# Patient Record
Sex: FEMALE | Race: WHITE
[De-identification: names, ages, dates, MRNs, and addresses within clinical notes are randomized per-mention and may not be internally consistent; named-entity substitution may affect disease eponyms.]

---

## 2020-02-04 ENCOUNTER — HOSPITAL ENCOUNTER (INPATIENT)
Dept: HOSPITAL 82 - MS2 | Age: 80
LOS: 5 days | Discharge: NURSING, REHAB OR LONG-TERM CARE | DRG: 191 | End: 2020-02-09
Attending: INTERNAL MEDICINE | Admitting: INTERNAL MEDICINE
Payer: MEDICARE

## 2020-02-04 VITALS — BODY MASS INDEX: 50.02 KG/M2 | WEIGHT: 293 LBS | HEIGHT: 64 IN

## 2020-02-04 VITALS — SYSTOLIC BLOOD PRESSURE: 128 MMHG | DIASTOLIC BLOOD PRESSURE: 66 MMHG

## 2020-02-04 VITALS — DIASTOLIC BLOOD PRESSURE: 84 MMHG | SYSTOLIC BLOOD PRESSURE: 143 MMHG

## 2020-02-04 VITALS — SYSTOLIC BLOOD PRESSURE: 140 MMHG | DIASTOLIC BLOOD PRESSURE: 84 MMHG

## 2020-02-04 DIAGNOSIS — E11.65: ICD-10-CM

## 2020-02-04 DIAGNOSIS — Z95.5: ICD-10-CM

## 2020-02-04 DIAGNOSIS — Z79.01: ICD-10-CM

## 2020-02-04 DIAGNOSIS — N18.3: ICD-10-CM

## 2020-02-04 DIAGNOSIS — G47.33: ICD-10-CM

## 2020-02-04 DIAGNOSIS — I48.20: ICD-10-CM

## 2020-02-04 DIAGNOSIS — E11.22: ICD-10-CM

## 2020-02-04 DIAGNOSIS — Z79.4: ICD-10-CM

## 2020-02-04 DIAGNOSIS — E78.5: ICD-10-CM

## 2020-02-04 DIAGNOSIS — I25.10: ICD-10-CM

## 2020-02-04 DIAGNOSIS — Z99.81: ICD-10-CM

## 2020-02-04 DIAGNOSIS — T38.0X5A: ICD-10-CM

## 2020-02-04 DIAGNOSIS — E66.01: ICD-10-CM

## 2020-02-04 DIAGNOSIS — E87.5: ICD-10-CM

## 2020-02-04 DIAGNOSIS — J43.9: Primary | ICD-10-CM

## 2020-02-04 DIAGNOSIS — E03.9: ICD-10-CM

## 2020-02-04 LAB
ALBUMIN SERPL-MCNC: 4.4 G/DL (ref 3.2–5)
ALP SERPL-CCNC: 83 U/L (ref 38–126)
ANION GAP SERPL CALCULATED.3IONS-SCNC: 21 MMOL/L
AST SERPL-CCNC: 25 U/L (ref 9–36)
BACTERIA #/AREA URNS HPF: (no result) HPF
BASOPHILS NFR BLD AUTO: 1 % (ref 0–3)
BILIRUB UR QL STRIP.AUTO: NEGATIVE
BUN SERPL-MCNC: 39 MG/DL (ref 8–23)
BUN/CREAT SERPL: 28
CHLORIDE SERPL-SCNC: 97 MMOL/L (ref 95–108)
CLARITY UR: (no result)
CO2 SERPL-SCNC: 23 MMOL/L (ref 22–30)
COLOR UR AUTO: YELLOW
CREAT SERPL-MCNC: 1.4 MG/DL (ref 0.5–1)
EOSINOPHIL NFR BLD AUTO: 1 % (ref 0–8)
ERYTHROCYTE [DISTWIDTH] IN BLOOD BY AUTOMATED COUNT: 13.3 % (ref 11.5–15.5)
GLUCOSE UR STRIP.AUTO-MCNC: NEGATIVE MG/DL
HCT VFR BLD AUTO: 52.1 % (ref 37–47)
HGB BLD-MCNC: 17 G/DL (ref 12–16)
HGB UR QL STRIP.AUTO: NEGATIVE
IMM GRANULOCYTES NFR BLD: 1.2 % (ref 0–5)
KETONES UR STRIP.AUTO-MCNC: NEGATIVE MG/DL
LEUKOCYTE ESTERASE UR QL STRIP.AUTO: (no result)
LYMPHOCYTES NFR BLD: 14 % (ref 15–41)
MCH RBC QN AUTO: 31 PG  CALC (ref 26–32)
MCHC RBC AUTO-ENTMCNC: 32.6 G/L CALC (ref 32–36)
MCV RBC AUTO: 94.9 FL  CALC (ref 80–100)
MONOCYTES NFR BLD AUTO: 12 % (ref 2–13)
NEUTROPHILS # BLD AUTO: 7.07 THOU/UL (ref 2–7.15)
NEUTROPHILS NFR BLD AUTO: 71 % (ref 42–76)
NITRITE UR QL STRIP.AUTO: NEGATIVE
PH UR STRIP.AUTO: 5 [PH] (ref 4.5–8)
PLATELET # BLD AUTO: 311 THOU/UL (ref 130–400)
POTASSIUM SERPL-SCNC: 5.5 MMOL/L (ref 3.5–5.1)
PROT SERPL-MCNC: 7.6 G/DL (ref 6.3–8.2)
PROT UR STRIP.AUTO-MCNC: NEGATIVE MG/DL
RBC # BLD AUTO: 5.49 MILL/UL (ref 4.2–5.6)
RBC #/AREA URNS HPF: (no result) RBC/HPF (ref 0–5)
SODIUM SERPL-SCNC: 135 MMOL/L (ref 137–146)
SP GR UR STRIP.AUTO: 1.02
SQUAMOUS URNS QL MICRO: (no result) EPI/HPF
UROBILINOGEN UR QL STRIP.AUTO: 0.2 E.U./DL
WBC #/AREA URNS HPF: (no result) WBC/HPF (ref 0–5)

## 2020-02-04 NOTE — NUR
PT. SITTING UP ON THE SIDE OF THE BED WITH SOB ON EXERTION NOTED. UA OBTAINED.
O2 INFUSING PER NC AS PER ORDER. PT. REPORTS SHE WEARS 3LITERS/MIN AT NIGHT,
INCREASED AT THIS TIME. ASSESSMENT COMPLETED. IV SITE PATENT AND SL. TELEMETRY
IN PLACE. PT. DECLINES ANJEL HOSE TO BE APPLIED. DRY, FLAKEY SKIN NOTED AND
BRUISING NOTED TO BUE. INSTRUCTED TO CALL FOR ANY NEEDS. CALL LIGHT IS IN
REACH. WILL CONTINUE TO MONITOR.

## 2020-02-04 NOTE — NUR
PT. SITTING UP ON THE SIDE OF THE BED WITH NO DISTRESS NOTED; SCHED ABT HUNG.
VSS. ENCOURAGED TO CALL FOR ANY NEEDS. CALL LIGHT IS IN REACH.

## 2020-02-04 NOTE — NUR
PT. RETURNED FROM XRAY AND MEDICATED WITH ORDERED /SCHED MEDS. PO FLUIDS
OFFERED.HUMIDIFIER APPLIED TO O2 PER PT'S REQUEST. DENIES FURTHER NEEDS. CALL
LIGHT IS IN REACH.

## 2020-02-05 VITALS — DIASTOLIC BLOOD PRESSURE: 63 MMHG | SYSTOLIC BLOOD PRESSURE: 128 MMHG

## 2020-02-05 VITALS — SYSTOLIC BLOOD PRESSURE: 150 MMHG | DIASTOLIC BLOOD PRESSURE: 74 MMHG

## 2020-02-05 VITALS — DIASTOLIC BLOOD PRESSURE: 85 MMHG | SYSTOLIC BLOOD PRESSURE: 147 MMHG

## 2020-02-05 VITALS — SYSTOLIC BLOOD PRESSURE: 146 MMHG | DIASTOLIC BLOOD PRESSURE: 98 MMHG

## 2020-02-05 VITALS — SYSTOLIC BLOOD PRESSURE: 145 MMHG | DIASTOLIC BLOOD PRESSURE: 84 MMHG

## 2020-02-05 VITALS — SYSTOLIC BLOOD PRESSURE: 148 MMHG | DIASTOLIC BLOOD PRESSURE: 90 MMHG

## 2020-02-05 LAB
ALBUMIN SERPL-MCNC: 4.1 G/DL (ref 3.2–5)
ALP SERPL-CCNC: 77 U/L (ref 38–126)
ANION GAP SERPL CALCULATED.3IONS-SCNC: 18 MMOL/L
AST SERPL-CCNC: 41 U/L (ref 9–36)
BASOPHILS NFR BLD AUTO: 0 % (ref 0–3)
BUN SERPL-MCNC: 44 MG/DL (ref 8–23)
BUN/CREAT SERPL: 30
CHLORIDE SERPL-SCNC: 93 MMOL/L (ref 95–108)
CO2 SERPL-SCNC: 25 MMOL/L (ref 22–30)
CREAT SERPL-MCNC: 1.5 MG/DL (ref 0.5–1)
EOSINOPHIL NFR BLD AUTO: 0 % (ref 0–8)
ERYTHROCYTE [DISTWIDTH] IN BLOOD BY AUTOMATED COUNT: 13.2 % (ref 11.5–15.5)
HCT VFR BLD AUTO: 46.5 % (ref 37–47)
HGB BLD-MCNC: 15.3 G/DL (ref 12–16)
IMM GRANULOCYTES NFR BLD: 0.7 % (ref 0–5)
LYMPHOCYTES NFR BLD: 7 % (ref 15–41)
MAGNESIUM SERPL-MCNC: 1.8 MG/DL (ref 1.6–2.3)
MCH RBC QN AUTO: 30.8 PG  CALC (ref 26–32)
MCHC RBC AUTO-ENTMCNC: 32.9 G/L CALC (ref 32–36)
MCV RBC AUTO: 93.6 FL  CALC (ref 80–100)
MONOCYTES NFR BLD AUTO: 5 % (ref 2–13)
NEUTROPHILS # BLD AUTO: 10.71 THOU/UL (ref 2–7.15)
NEUTROPHILS NFR BLD AUTO: 88 % (ref 42–76)
PLATELET # BLD AUTO: 304 THOU/UL (ref 130–400)
POTASSIUM SERPL-SCNC: 6 MMOL/L (ref 3.5–5.1)
PROT SERPL-MCNC: 7.3 G/DL (ref 6.3–8.2)
RBC # BLD AUTO: 4.97 MILL/UL (ref 4.2–5.6)
SODIUM SERPL-SCNC: 130 MMOL/L (ref 137–146)

## 2020-02-05 NOTE — NUR
IV SITE PATENT AND FLUSHED WITH NS. DENIES FURTHER NEEDS. CALL LIGHT IS IN
REACH. WILL CONTINUE TO MONITOR.

## 2020-02-05 NOTE — NUR
ASSESSMENT IS COMPLETED: IV SITE IS FREE FROM REDNESS OR EDMEA. HR IS IRREG,
PULSES ARE STRONG X4, ABD IS SOFT WITH ACTIVE BS,.BREATH SOUNDS ARE CLEAR AND
DIMINSHED. O2 @ 2LITERSD WHILE AWAKE,. TELE MONTIOR IN PLACE.

## 2020-02-05 NOTE — NUR
PT IS RELAXING IN BED , VISITING WITH FAMILY NO DISTRESS NTOED. IV SITE IS
FREE FROMR EDNESS OR EDEMA.

## 2020-02-05 NOTE — NUR
PT IS RELAXING IN BED WITH NO DISTRESS NOTED. IV SITE IS FREE FROM REDNESS OR
EDEMA. CONTINEU TO OSBERVE AND MONITOR.

## 2020-02-05 NOTE — NUR
PT. SITTING UP IN BED WITH EXERTIONAL SOB. VSS. O2 INFUSING PER NC. FRESH
WATER PROVIDED. DENIES FURTHER NEEDS. CALL LIGHT IS IN REACH.

## 2020-02-05 NOTE — NUR
PATIENT RESTING IN BED WITH C-PAP IN PLACE. ZOSYN HUNG VIA RAC AS ORDERED.
TELE MONITOR IN PLACE. NO COMPLAINTS AT THIS TIME. SAFETY PRECAUTIONS
REINFORCED. ALL LIGHT IN REACH. MEHRAN CONT TO MONITOR.

## 2020-02-05 NOTE — NUR
PT. SLIGHTLY RESTLESS AND SLEEPLESS AND MEDICATED WITH ORDERED PRN ATIVAN
ALONG WITH ONE TIME DOSE OF ELIQUIS. WILL REASSESS.

## 2020-02-06 VITALS — DIASTOLIC BLOOD PRESSURE: 93 MMHG | SYSTOLIC BLOOD PRESSURE: 147 MMHG

## 2020-02-06 VITALS — SYSTOLIC BLOOD PRESSURE: 137 MMHG | DIASTOLIC BLOOD PRESSURE: 75 MMHG

## 2020-02-06 VITALS — DIASTOLIC BLOOD PRESSURE: 85 MMHG | SYSTOLIC BLOOD PRESSURE: 143 MMHG

## 2020-02-06 VITALS — SYSTOLIC BLOOD PRESSURE: 166 MMHG | DIASTOLIC BLOOD PRESSURE: 79 MMHG

## 2020-02-06 VITALS — DIASTOLIC BLOOD PRESSURE: 73 MMHG | SYSTOLIC BLOOD PRESSURE: 139 MMHG

## 2020-02-06 VITALS — DIASTOLIC BLOOD PRESSURE: 85 MMHG | SYSTOLIC BLOOD PRESSURE: 154 MMHG

## 2020-02-06 LAB
ANION GAP SERPL CALCULATED.3IONS-SCNC: 21 MMOL/L
BASOPHILS NFR BLD AUTO: 0 % (ref 0–3)
BUN SERPL-MCNC: 47 MG/DL (ref 8–23)
BUN/CREAT SERPL: 31
CHLORIDE SERPL-SCNC: 94 MMOL/L (ref 95–108)
CO2 SERPL-SCNC: 23 MMOL/L (ref 22–30)
CREAT SERPL-MCNC: 1.5 MG/DL (ref 0.5–1)
EOSINOPHIL NFR BLD AUTO: 0 % (ref 0–8)
ERYTHROCYTE [DISTWIDTH] IN BLOOD BY AUTOMATED COUNT: 13.2 % (ref 11.5–15.5)
HCT VFR BLD AUTO: 50.6 % (ref 37–47)
HGB BLD-MCNC: 16.5 G/DL (ref 12–16)
IMM GRANULOCYTES NFR BLD: 0.7 % (ref 0–5)
LYMPHOCYTES NFR BLD: 6 % (ref 15–41)
MAGNESIUM SERPL-MCNC: 2.1 MG/DL (ref 1.6–2.3)
MCH RBC QN AUTO: 30.5 PG  CALC (ref 26–32)
MCHC RBC AUTO-ENTMCNC: 32.6 G/L CALC (ref 32–36)
MCV RBC AUTO: 93.5 FL  CALC (ref 80–100)
MONOCYTES NFR BLD AUTO: 6 % (ref 2–13)
NEUTROPHILS # BLD AUTO: 13.28 THOU/UL (ref 2–7.15)
NEUTROPHILS NFR BLD AUTO: 87 % (ref 42–76)
PLATELET # BLD AUTO: 302 THOU/UL (ref 130–400)
POTASSIUM SERPL-SCNC: 5.4 MMOL/L (ref 3.5–5.1)
RBC # BLD AUTO: 5.41 MILL/UL (ref 4.2–5.6)
SODIUM SERPL-SCNC: 133 MMOL/L (ref 137–146)

## 2020-02-06 NOTE — NUR
CHANGE OF SHIFT REPORT RECEIVED FROM ANTONIA PEREZ. PT IN BED. PT IS ABLE TO MAKE
HER NEEDS KNOWN. WRITER WILL CONTINUE TO MONITOR.

## 2020-02-06 NOTE — NUR
PATIENT A/OX4, NO C/O PAIN, NO S/S RESP DISTRESS, PATIENT ON O2 VIA NC,
PATIENT GLUCOSE LEVEL DECREASE  FROM 499, AFTER NEW ORDER GIVEN FOR
CRITICAL GLUCOSE LAB, ARNP ORDER NEW SLIDING SCALE, FOLLOWED MD ORDERS,CALL
LIGHT WITHIN REACH

## 2020-02-06 NOTE — NUR
APPEARS SLEEPING WITH C=PAP IN PLACE. ANGEL MONITOR IN PLACE. CALL LIGHT IN
REACH. WILL CONT TO MONITOR.

## 2020-02-06 NOTE — NUR
PATIENT A/OX4, NO S/S RESP DISTRESS, PATIENT ON O2 VIA NC, NEB TX, SOLU
MEDROL, PATIENT NO C/O PAIN, PATIENT DM CRITICAL GLUCOSE LEVEL 499, NOTIFIED
ARNP, ARNP ORDERED 15 UNITS OF INSULIN AND DECREASE DOSE OF MEDROL, WILL
CONTINUE MONITOR PATIENT CALL LIGHT WITHIN

## 2020-02-07 VITALS — DIASTOLIC BLOOD PRESSURE: 90 MMHG | SYSTOLIC BLOOD PRESSURE: 160 MMHG

## 2020-02-07 VITALS — SYSTOLIC BLOOD PRESSURE: 186 MMHG | DIASTOLIC BLOOD PRESSURE: 99 MMHG

## 2020-02-07 VITALS — DIASTOLIC BLOOD PRESSURE: 84 MMHG | SYSTOLIC BLOOD PRESSURE: 148 MMHG

## 2020-02-07 VITALS — DIASTOLIC BLOOD PRESSURE: 93 MMHG | SYSTOLIC BLOOD PRESSURE: 145 MMHG

## 2020-02-07 VITALS — DIASTOLIC BLOOD PRESSURE: 89 MMHG | SYSTOLIC BLOOD PRESSURE: 159 MMHG

## 2020-02-07 VITALS — DIASTOLIC BLOOD PRESSURE: 90 MMHG | SYSTOLIC BLOOD PRESSURE: 140 MMHG

## 2020-02-07 LAB
ANION GAP SERPL CALCULATED.3IONS-SCNC: 15 MMOL/L
BUN SERPL-MCNC: 46 MG/DL (ref 8–23)
BUN/CREAT SERPL: 32
CHLORIDE SERPL-SCNC: 92 MMOL/L (ref 95–108)
CO2 SERPL-SCNC: 27 MMOL/L (ref 22–30)
CREAT SERPL-MCNC: 1.4 MG/DL (ref 0.5–1)
ERYTHROCYTE [DISTWIDTH] IN BLOOD BY AUTOMATED COUNT: 13.2 % (ref 11.5–15.5)
HCT VFR BLD AUTO: 49.7 % (ref 37–47)
HGB BLD-MCNC: 16.2 G/DL (ref 12–16)
MAGNESIUM SERPL-MCNC: 2.2 MG/DL (ref 1.6–2.3)
MCH RBC QN AUTO: 30.9 PG  CALC (ref 26–32)
MCHC RBC AUTO-ENTMCNC: 32.6 G/L CALC (ref 32–36)
MCV RBC AUTO: 94.7 FL  CALC (ref 80–100)
PLATELET # BLD AUTO: 263 THOU/UL (ref 130–400)
POTASSIUM SERPL-SCNC: 5.4 MMOL/L (ref 3.5–5.1)
RBC # BLD AUTO: 5.25 MILL/UL (ref 4.2–5.6)
SODIUM SERPL-SCNC: 129 MMOL/L (ref 137–146)

## 2020-02-07 NOTE — NUR
PATIENT A/OX4, NO S/S RESP DISTRESS, PATIENT ON O2 VIA NC, PATIENT C/O 4/10
LOWER BACK WILL TREAT PATIENT BACK PAIN PER MD ORDERS, PATIENT BLOOD GLUCOSE
ELEVATED DUE TO SOLU MEDROL, PATIENT BLOOD GLUCOSE 325, PATIENT HEART RHYTHM
IN AFIB CONTROLLED, CALL LIGHT WITHIN REACH

## 2020-02-07 NOTE — NUR
PT SITTING UP IN BED, ALERT AND ORIENTED. RESPIRATIONS EVEN AND UNLABORED ON
O2 @ 3L VIA NC. LUNGS SOUND CLEAR/DIMINISHED. PEDAL PULSES WEAK. PT DENIES ANY
PAIN OR DICOMFORT AT THIS TIME.SAFETY PRECAUTIONS IN PLACE. WILL CONTINUE TO
MONITOR.

## 2020-02-07 NOTE — NUR
REPORT RECEIVED FROM ANTONIA PEREZ. PT IN THE SHOWER, DAUGHTER ASSISTING PT.
SAFETY PRECAUTIONS IN PLACE. WILL CONTINUE TO MONITOR.

## 2020-02-07 NOTE — NUR
PATIENT VOIDED X 1; ANTIBIOTIC RUNNNG PER ORDER. NO S/S OF DISTRESS. WRITER
WILL CONTINUE TO MONITOR

## 2020-02-07 NOTE — NUR
PATIENT A/OX4, NO S/S RESP DISTRESS PATIENT ON O2 VIA NC NEB TX AND SOLU
MEDROL, PATIENT TOLERATING IV ANTIBOTICS, PATIENT NO C/O PAIN AFTER PRN
TYLENOL GIVEN TYLENOL WAS EFFECTIVE, CALL LIGHT WITHIN REACH

## 2020-02-08 VITALS — DIASTOLIC BLOOD PRESSURE: 77 MMHG | SYSTOLIC BLOOD PRESSURE: 153 MMHG

## 2020-02-08 VITALS — DIASTOLIC BLOOD PRESSURE: 68 MMHG | SYSTOLIC BLOOD PRESSURE: 151 MMHG

## 2020-02-08 VITALS — DIASTOLIC BLOOD PRESSURE: 78 MMHG | SYSTOLIC BLOOD PRESSURE: 155 MMHG

## 2020-02-08 VITALS — DIASTOLIC BLOOD PRESSURE: 76 MMHG | SYSTOLIC BLOOD PRESSURE: 159 MMHG

## 2020-02-08 VITALS — DIASTOLIC BLOOD PRESSURE: 86 MMHG | SYSTOLIC BLOOD PRESSURE: 164 MMHG

## 2020-02-08 VITALS — DIASTOLIC BLOOD PRESSURE: 89 MMHG | SYSTOLIC BLOOD PRESSURE: 161 MMHG

## 2020-02-08 LAB
ANION GAP SERPL CALCULATED.3IONS-SCNC: 15 MMOL/L
BUN SERPL-MCNC: 47 MG/DL (ref 8–23)
BUN/CREAT SERPL: 34
CHLORIDE SERPL-SCNC: 94 MMOL/L (ref 95–108)
CO2 SERPL-SCNC: 31 MMOL/L (ref 22–30)
CREAT SERPL-MCNC: 1.4 MG/DL (ref 0.5–1)
ERYTHROCYTE [DISTWIDTH] IN BLOOD BY AUTOMATED COUNT: 13.2 % (ref 11.5–15.5)
HCT VFR BLD AUTO: 47.3 % (ref 37–47)
HGB BLD-MCNC: 15.6 G/DL (ref 12–16)
MAGNESIUM SERPL-MCNC: 2.3 MG/DL (ref 1.6–2.3)
MCH RBC QN AUTO: 30.9 PG  CALC (ref 26–32)
MCHC RBC AUTO-ENTMCNC: 33 G/L CALC (ref 32–36)
MCV RBC AUTO: 93.7 FL  CALC (ref 80–100)
PLATELET # BLD AUTO: 278 THOU/UL (ref 130–400)
POTASSIUM SERPL-SCNC: 4.9 MMOL/L (ref 3.5–5.1)
RBC # BLD AUTO: 5.05 MILL/UL (ref 4.2–5.6)
SODIUM SERPL-SCNC: 135 MMOL/L (ref 137–146)

## 2020-02-08 NOTE — NUR
SLEEPING ON ROUNDS. AWAKENS TO NAME. USNG O2 AT 3 L NC. O2 SAT 97% RESP
NON-LABORED AT REST, HERNANDEZ. BREATH SOUNDS ESSENTIALLY CLEAR, DIMINISHED
THROUGHOUT LUNG MERRILL. MOIST, NON-PRODUCTIVE COUGH NOTED. KATHLEEN AND BLE WITH
PIGMENTATION CHANGES. SALINE LOCK IN RAC, FLUSHED AND PATENT.DISCUSSED PLAN OF
CARE. PATIENT HOPES TO BE DISCHARGED TODAY. DENIES NEEDS AT THIS TIME. CALL
BELL IN REACH.

## 2020-02-08 NOTE — NUR
REPORT RECEIVED FROM ANTONIA DONG. PT SITTING IN BED. NO S/S OF DISTRESS. SAFETY
PRECAUTIONS IN PLACE. WILL CONTINUE TO MONITOR.

## 2020-02-08 NOTE — NUR
PT SITTING ON THE SIDE OF THE BED, ALERT AND ORIENTED. RESPIRATIONS EVEN AND
UNLABORED ON O2 @ 3L VIA NC. LUNGS SOUND DIMINISHED. PEDAL PULSES WEAK. PT
DENIES ANY PAIN OR DISCOMFORT AT THIS TIME. ADDED WATER TO CPAP PER REQUEST.
NO IV SITE AT THIS TIME, MD AWARE. SAFETY PRECAUTIONS IN PLACE. WILL CONTINUE
TO MONITOR.

## 2020-02-08 NOTE — NUR
PT RESTING IN BED. NO S/S OF DISTRESS AT THIS TIME. SAFETY PRECAUTIONS IN
PLACE. WILL CONTINUE TO MONITOR.

## 2020-02-08 NOTE — NUR
RESTING IN BED. DISCUSSED PLAN OF CARE. DISCHARGE IS CURRENTLY ON HOLD. IV
MEDICATIONS WILL BE CHANGED TO PO AND WILL LEAVE WITHOUT IV ACCESS AT THIS
TIME PER5 E EVERARDO/MILO.

## 2020-02-08 NOTE — NUR
PATIENT C/O IV SITE TENDER, STOPPED IVAB INFUSION. SALINE LOCK DC'D WITH
CATHETER INTACT. UNABLE TO ESTABLISH NEW IV SITE. WILL INFORM MD.

## 2020-02-08 NOTE — NUR
PT SITTING IN BED, ALERT AND ORIENTED. RESPIRATIONS EVEN AND UNLABORED ON O2 @
3L VIA NC. NO S/S OF DISTRESS AT THIS TIME. SAFETY PRECAUTIONS IN PLACE. WILL
CONTINUE TO MONTIOR.

## 2020-02-09 VITALS — SYSTOLIC BLOOD PRESSURE: 148 MMHG | DIASTOLIC BLOOD PRESSURE: 78 MMHG

## 2020-02-09 VITALS — SYSTOLIC BLOOD PRESSURE: 145 MMHG | DIASTOLIC BLOOD PRESSURE: 64 MMHG

## 2020-02-09 VITALS — SYSTOLIC BLOOD PRESSURE: 136 MMHG | DIASTOLIC BLOOD PRESSURE: 82 MMHG

## 2020-02-09 LAB
ANION GAP SERPL CALCULATED.3IONS-SCNC: 12 MMOL/L
BASOPHILS NFR BLD AUTO: 0 % (ref 0–3)
BUN SERPL-MCNC: 52 MG/DL (ref 8–23)
BUN/CREAT SERPL: 34
CHLORIDE SERPL-SCNC: 94 MMOL/L (ref 95–108)
CO2 SERPL-SCNC: 33 MMOL/L (ref 22–30)
CREAT SERPL-MCNC: 1.5 MG/DL (ref 0.5–1)
EOSINOPHIL NFR BLD AUTO: 0 % (ref 0–8)
ERYTHROCYTE [DISTWIDTH] IN BLOOD BY AUTOMATED COUNT: 13.2 % (ref 11.5–15.5)
HCT VFR BLD AUTO: 48 % (ref 37–47)
HGB BLD-MCNC: 15.6 G/DL (ref 12–16)
IMM GRANULOCYTES NFR BLD: 0.9 % (ref 0–5)
LYMPHOCYTES NFR BLD: 13 % (ref 15–41)
MAGNESIUM SERPL-MCNC: 2.3 MG/DL (ref 1.6–2.3)
MCH RBC QN AUTO: 30.6 PG  CALC (ref 26–32)
MCHC RBC AUTO-ENTMCNC: 32.5 G/L CALC (ref 32–36)
MCV RBC AUTO: 94.1 FL  CALC (ref 80–100)
MONOCYTES NFR BLD AUTO: 18 % (ref 2–13)
NEUTROPHILS # BLD AUTO: 9.93 THOU/UL (ref 2–7.15)
NEUTROPHILS NFR BLD AUTO: 68 % (ref 42–76)
PLATELET # BLD AUTO: 290 THOU/UL (ref 130–400)
POTASSIUM SERPL-SCNC: 4.2 MMOL/L (ref 3.5–5.1)
RBC # BLD AUTO: 5.1 MILL/UL (ref 4.2–5.6)
SODIUM SERPL-SCNC: 134 MMOL/L (ref 137–146)

## 2020-02-09 NOTE — NUR
3003 COMPLETED. NO C/O PAIN NOR DISCOMFORT. NO S/S OF RESP DISTRESS. NO SKIN
ISSUES NOTED BUT BRUISES ON ARMS/LEGS. PATIENT ON BLOOD THINNER. PATIENT
FRIEND AT BEDSIDE IS TAKING PATIENT TO SNF

## 2020-02-09 NOTE — NUR
PT RESTING IN BED, NO S/S OF DISTRESS AT THIS TIME. SAFETY PRECAUTIONS IN
PLACE. WILL CONTIUE TO MONITOR.

## 2020-02-09 NOTE — NUR
HEAD TO ASSESSMENT COMPLETED. POC DISCUSSED. NO C/O RESP DISTRESS NOR S/S.
POSTIVE FOR BOWEL SOUNDS. SKIN INTACT. NO C/O PAIN. PATIENT A&0X3

## 2020-11-02 ENCOUNTER — HOSPITAL ENCOUNTER (INPATIENT)
Dept: HOSPITAL 82 - MS2 | Age: 80
LOS: 10 days | Discharge: NURSING, REHAB OR LONG-TERM CARE | DRG: 292 | End: 2020-11-12
Attending: INTERNAL MEDICINE | Admitting: INTERNAL MEDICINE
Payer: MEDICARE

## 2020-11-02 VITALS — DIASTOLIC BLOOD PRESSURE: 77 MMHG | SYSTOLIC BLOOD PRESSURE: 149 MMHG

## 2020-11-02 VITALS — WEIGHT: 293 LBS | HEIGHT: 64 IN | BODY MASS INDEX: 50.02 KG/M2

## 2020-11-02 VITALS — DIASTOLIC BLOOD PRESSURE: 79 MMHG | SYSTOLIC BLOOD PRESSURE: 141 MMHG

## 2020-11-02 VITALS — SYSTOLIC BLOOD PRESSURE: 157 MMHG | DIASTOLIC BLOOD PRESSURE: 67 MMHG

## 2020-11-02 VITALS — SYSTOLIC BLOOD PRESSURE: 162 MMHG | DIASTOLIC BLOOD PRESSURE: 69 MMHG

## 2020-11-02 DIAGNOSIS — T50.1X6A: ICD-10-CM

## 2020-11-02 DIAGNOSIS — E78.5: ICD-10-CM

## 2020-11-02 DIAGNOSIS — Z91.128: ICD-10-CM

## 2020-11-02 DIAGNOSIS — I50.33: Primary | ICD-10-CM

## 2020-11-02 DIAGNOSIS — N18.30: ICD-10-CM

## 2020-11-02 DIAGNOSIS — I48.20: ICD-10-CM

## 2020-11-02 DIAGNOSIS — E11.22: ICD-10-CM

## 2020-11-02 DIAGNOSIS — E03.9: ICD-10-CM

## 2020-11-02 DIAGNOSIS — G47.33: ICD-10-CM

## 2020-11-02 DIAGNOSIS — Z79.01: ICD-10-CM

## 2020-11-02 DIAGNOSIS — E66.01: ICD-10-CM

## 2020-11-02 DIAGNOSIS — F41.9: ICD-10-CM

## 2020-11-02 DIAGNOSIS — J43.9: ICD-10-CM

## 2020-11-02 DIAGNOSIS — M25.50: ICD-10-CM

## 2020-11-02 DIAGNOSIS — E87.5: ICD-10-CM

## 2020-11-02 DIAGNOSIS — Z79.4: ICD-10-CM

## 2020-11-02 DIAGNOSIS — Z99.81: ICD-10-CM

## 2020-11-02 DIAGNOSIS — I25.10: ICD-10-CM

## 2020-11-02 DIAGNOSIS — Z95.5: ICD-10-CM

## 2020-11-02 DIAGNOSIS — N17.9: ICD-10-CM

## 2020-11-02 DIAGNOSIS — E87.3: ICD-10-CM

## 2020-11-02 DIAGNOSIS — Z20.828: ICD-10-CM

## 2020-11-02 LAB
ALBUMIN SERPL-MCNC: 3.9 G/DL (ref 3.2–5)
ALP SERPL-CCNC: 92 U/L (ref 38–126)
ANION GAP SERPL CALCULATED.3IONS-SCNC: 15 MMOL/L
AST SERPL-CCNC: 34 U/L (ref 9–36)
BASOPHILS NFR BLD AUTO: 1 % (ref 0–3)
BILIRUB UR QL STRIP.AUTO: NEGATIVE
BUN SERPL-MCNC: 45 MG/DL (ref 8–23)
BUN/CREAT SERPL: 39
CHLORIDE SERPL-SCNC: 102 MMOL/L (ref 95–108)
CLARITY UR: CLEAR
CO2 SERPL-SCNC: 28 MMOL/L (ref 22–30)
COLOR UR AUTO: YELLOW
CREAT SERPL-MCNC: 1.2 MG/DL (ref 0.5–1)
EOSINOPHIL NFR BLD AUTO: 2 % (ref 0–8)
ERYTHROCYTE [DISTWIDTH] IN BLOOD BY AUTOMATED COUNT: 17.2 % (ref 11.5–15.5)
GLUCOSE UR STRIP.AUTO-MCNC: NEGATIVE MG/DL
HCT VFR BLD AUTO: 45.7 % (ref 37–47)
HGB BLD-MCNC: 13.5 G/DL (ref 12–16)
HGB UR QL STRIP.AUTO: (no result)
IMM GRANULOCYTES NFR BLD: 0.4 % (ref 0–5)
KETONES UR STRIP.AUTO-MCNC: NEGATIVE MG/DL
LEUKOCYTE ESTERASE UR QL STRIP.AUTO: NEGATIVE
LYMPHOCYTES NFR BLD: 11 % (ref 15–41)
MCH RBC QN AUTO: 30.7 PG  CALC (ref 26–32)
MCHC RBC AUTO-ENTMCNC: 29.5 G/DL CAL (ref 32–36)
MCV RBC AUTO: 103.9 FL  CALC (ref 80–100)
MONOCYTES NFR BLD AUTO: 15 % (ref 2–13)
NEUTROPHILS # BLD AUTO: 5.17 THOU/UL (ref 2–7.15)
NEUTROPHILS NFR BLD AUTO: 70 % (ref 42–76)
NITRITE UR QL STRIP.AUTO: NEGATIVE
PH UR STRIP.AUTO: 5.5 [PH] (ref 4.5–8)
PLATELET # BLD AUTO: 244 THOU/UL (ref 130–400)
POTASSIUM SERPL-SCNC: 5.9 MMOL/L (ref 3.5–5.1)
PROT SERPL-MCNC: 7 G/DL (ref 6.3–8.2)
PROT UR STRIP.AUTO-MCNC: NEGATIVE MG/DL
RBC # BLD AUTO: 4.4 MILL/UL (ref 4.2–5.6)
SODIUM SERPL-SCNC: 139 MMOL/L (ref 137–146)
SP GR UR STRIP.AUTO: 1.01
UROBILINOGEN UR QL STRIP.AUTO: 0.2 E.U./DL

## 2020-11-02 PROCEDURE — 0T9B70Z DRAINAGE OF BLADDER WITH DRAINAGE DEVICE, VIA NATURAL OR ARTIFICIAL OPENING: ICD-10-PCS | Performed by: INTERNAL MEDICINE

## 2020-11-02 NOTE — NUR
PT ARRIVED TO MED/SURG ROOM 262 IN STABLE CONDITION VIA WHEELCHAIR ACCOMPANIED
BY DAUGHTER;PT ASSISTED TO STANDING SCALE AND BEDSIDE WITH X2 ASSIST;PT A&O
X3,ORIENTED TO ROOM AND CALL LIGHT SYSTEM;PT REPORTS INCREASED SOB WITH
EXERTION;WT AND VS OBTAINED BY MARLEN SINGH;PT DENIES ANY CURRENT PAIN OR
DISCOMFORTS,PAIN SCALE AND REPORTING EDUCATED;RESPIRATIONS SHALLOW ON O2 @ 3L
VIA NC,IT SHOULD BE NOTED THAT PT IS OXYGEN DEPENDENT;EXERTIONAL SOB NOTED
WITH ANY SLIGHT MOVEMENT;NON-PRODUCTIVE COUGH NOTED;ABDOMEN DISTENDED/FIRM ON
PALPATION AND ACTIVE IN ALL 4 QUADRANTS,LAST BM 11/02/20;WEAK PEDAL
PULSES;MULTIPLE SCABBED AREAS NOTED THROUGHOUT BODY,SKIN OTHERWISE INTACT;TELE
MONITORING PLACED ON PATIENT;#22G STARTED TO LAC ON 4TH ATTEMPT BY
ANTONIA FERNÁNDEZ;FALL AND ALLERGY BAND APPLIED TO RIGHT HAND;PT DENIES ANY
ADDITIONAL NEEDS AT THIS TIME AND IS ENCOURAGED TO CALL FOR ASSISTANCE IF
NEEDED;FALL PRECAUTIONS IN PLACE WITH BED IN THE LOWEST POSITION AND CALL
LIGHT IN REACH;WILL CONTINUE TO MONITOR

## 2020-11-02 NOTE — NUR
PATIENT RESTING IN BED POSITIONED ON LEFT SIDE WITH O2 VIA NASAL CANNULA IN
PLACE. EYES CLOSED AND APPEARS SLEEPING-LABORED BREATHING. WHITE CATH PATENT
AND DRAINING YELLOW URINE. TELE MONITOR IN PLACE. SALINE LOCK TO RAC INTACT.
CALL LIGHT IN REACH. WILL CONT TO MONITOR.

## 2020-11-02 NOTE — NUR
#16F WHITE INSERTED AT THIS, PT TOLERATED WELL. 800CC OF CLEAR/YELLOW URINE
OBTAINED AT THIS TIME. CATHETER SIGNED AND DATED.WILL CONTINUE TO MONITOR

## 2020-11-02 NOTE — NUR
IV SITE LOST DUE TO INFILTRATION,SITE REMOVED WITH CATHETER INTACT;NEW #22G
STARTED TO RAC ON 1ST ATTEMPT BY ANTONIA FERNÁNDEZ;WILL CONTINUE TO MONITOR

## 2020-11-02 NOTE — NUR
PATIENT SITTING ON THE SIDE OF THE BED WITH O2 VIA NASAL CANNULA IN PLACE.
PATIENT IS SOB WITH NO EXHERSION. WEARS O2 AT HOME AS WELL. BS-150. PATIENT
MEDICATED WITH LEVEMIR 32UNITS AS ORDERED. HS SNACK PROVIDED. PATIENT WITH C/O
PAIN TO KNEES AND ANKLES-MEDICATED WITH TYLENOL 650MG PO FOR 7/10 ON PAIN
SCALE. PATIENT MEDICATED WITH XANAX 0.5MG PO FOR ANXIETY. LUNGS ARE DIMINISHED
WITH CRACKLES IN THE BASES. GENERALIZED EDEMA NOTED. WHITE CATH PATENT AND
DRAINING YELLOW URINE. PATIENT STATES THAT SHE WAS HAVING SOME BLADDER SPASMS
BUT BETTER NOW. TELE MONITOR IN PLACE. SALINE LOCK TO RAC INTACT WITH GOOD
BLOOD RETURN. SAFETY PRECAUTIONS REINFORCED. CALL LIGHT IN REACH. WILL CONT TO
MONITOR.

## 2020-11-02 NOTE — NUR
PT EDUCATED TO BRING IN HOME TRELEGY FOR VERIFICATION AND USE, PT REPORTS THAT
SHE WILL HAVE FAMILY BRING IN MEDICATION.

## 2020-11-02 NOTE — NUR
PT TRANSPORTED TO [facility] IN STABLE CONDITION VIA WHEELCHAIR ACCOMPANIED BY
MARLEN HOLLAND.

## 2020-11-03 VITALS — SYSTOLIC BLOOD PRESSURE: 137 MMHG | DIASTOLIC BLOOD PRESSURE: 79 MMHG

## 2020-11-03 VITALS — SYSTOLIC BLOOD PRESSURE: 113 MMHG | DIASTOLIC BLOOD PRESSURE: 60 MMHG

## 2020-11-03 VITALS — SYSTOLIC BLOOD PRESSURE: 150 MMHG | DIASTOLIC BLOOD PRESSURE: 55 MMHG

## 2020-11-03 VITALS — DIASTOLIC BLOOD PRESSURE: 89 MMHG | SYSTOLIC BLOOD PRESSURE: 139 MMHG

## 2020-11-03 VITALS — DIASTOLIC BLOOD PRESSURE: 88 MMHG | SYSTOLIC BLOOD PRESSURE: 145 MMHG

## 2020-11-03 LAB
ALBUMIN SERPL-MCNC: 4.1 G/DL (ref 3.2–5)
ALP SERPL-CCNC: 117 U/L (ref 38–126)
ANION GAP SERPL CALCULATED.3IONS-SCNC: 16 MMOL/L
AST SERPL-CCNC: 37 U/L (ref 9–36)
BUN SERPL-MCNC: 46 MG/DL (ref 8–23)
BUN/CREAT SERPL: 33
CHLORIDE SERPL-SCNC: 101 MMOL/L (ref 95–108)
CO2 SERPL-SCNC: 29 MMOL/L (ref 22–30)
CREAT SERPL-MCNC: 1.4 MG/DL (ref 0.5–1)
POTASSIUM SERPL-SCNC: 6.4 MMOL/L (ref 3.5–5.1)
PROT SERPL-MCNC: 6.9 G/DL (ref 6.3–8.2)
SODIUM SERPL-SCNC: 140 MMOL/L (ref 137–146)

## 2020-11-03 NOTE — NUR
ST screen completed. Recommend ST services to complete dysphagia assessment
d/t reports of difficulties chewing and swallowing on admission, oxygen
demands, and history of COPD, which places patient at a high risk for
aspiration. Consider cognitive screen.

## 2020-11-03 NOTE — NUR
PATIENT RESTING IN BED AT THIS TIME-POSITIONED ON LEFT SIDE. O2 VIA NASAL
CANNULA IN PLACE AT 3LPM. HOB ELEVATED. WHITE CATH PATENT AND DRAINING YELLOW
URINE. SALINE LOCK INTACT TO RAC. CALL LIGHT IN REACH. WILL CONT TO MONITOR.

## 2020-11-03 NOTE — NUR
PT MEDICATED WITH PTN TYLENOL 650MG PO FOR BLE PAIN RATING 9/10 ON THE PAIN
SCALE PER REQUEST, WILL CONTINUE TO MONITOR FOR EFFECTIVENESS

## 2020-11-03 NOTE — NUR
PATIENT SITTING UP ON SIDE OF BED AT THIS TIME. PATIENT DENIES ALL NEEDS
SIDERAILS UP CALL LIGHT WITHIN REACH. PATIENT STATES "NO" TO PAIN WITH ASKED
AND STATES PAIN IS A "0" OUT OF A SCALE OF 0-10.

## 2020-11-03 NOTE — NUR
PATIENT RESTING IN BED AT THIS TIME WITH HOB ELEVATED AND O2 VIA NASAL CANNULA
IN PLACE. WHITE PATENT AND DRAINING YELLOW URINE. SALINE LOCK INTACT TO
RAC-SITE APPEARS HEALTHY. TELE MONITOR IN PLACE. WEIGHT IS DOWN ALMOST 10LB
SINCE ADMISSION-353.8 THIS MORNING. CALL LIGHT IN REACH. WILL CONT TO MONITOR.

## 2020-11-03 NOTE — NUR
PATIENT SITTING UP AT BEDSIDE, O2 ON AT 3 LITERS, SON AT BEDSIDE. PATIENTS
RESPIRATIONS ARE LABORED AT THIS TIME. PATIENT DENIES ANY PAIN. WHITE INTACT
AND DRAINING CLEAR YELLOW URINE AT THIS TIME. NURSE ASSESSMENT DONE AT THIS
TIME SEE INTERVENTIONS. CALL LIGHT WITHIN REACH.

## 2020-11-03 NOTE — NUR
Patient is screened fro rehab intervention and would benefit from OT and PT
consults for functional asessment and energy conservation during ADLs if
medical agrees

## 2020-11-03 NOTE — NUR
Per discussion with Marcos KNIGHT, patient tolerating diet well. Will hold on ST
evaluation. Consult PRN.

## 2020-11-03 NOTE — NUR
PATIENT STILL AWAKE AND UNABLE TO SLEEP.  WEARING HER O2 VIA NASAL CANNULA AT
3LPM. C/O JOINT PAIN TO KNEES AND ANKLES-8/10 ON PAIN SCALE. MEDICATED WITH
TYLENOL 650MG PO FOR PAIN. MEDICATED WITH XANAX 0.5MG PO FOR RESTLESSNESS.
WHITE PATENT AND DRAINING YELLOW URINE. SAFETY PRECAUTIONS REINFORCED. CALL
LIGHT IN REACH. WILL CONT TO MONITOR.

## 2020-11-03 NOTE — NUR
PATIENT RESTING IN BED AT THIS TIME. PATIENT IS UP AND DOWN SEVERAL TIMES
ALREADY TONIGHT TRYING TO GET COMFORTABLE. PATIENT MEDICATED FOR JOINT PAIN
WITH TYLENOL 650MG PO FOR 6/10 PAIN SCALE. BS-228-MEDICATED WITH NOVALOG
2UNITS SQ AND WITH SCHEDULED LEVEMIR 32UNITS SQ-HS SNACK PROVIDED. LASIX 40MG
IVP VIA RAC SITE AND SOLU-MEDROL 40MG IVP GIVEN. GOOD BLOOD RETURN FROM RAC
SITE. SAFETY PRECAUTIONS REINFORCED. CALL LIGHT IN REACH. WILL CONT TO
MONITOR.

## 2020-11-03 NOTE — NUR
PATIENT SITTING UP ON THE SIDE OF THE BED-AWAKE ALERT AND ORIENTEDX3, O2 VIA
NASAL CANNULA IN PLACE. PATIENT IS SOB AT REST. TELE MONITOR IN PLACE. WHITE
CATH PATENT AND DRAINING YELLOW URINE-LEG STRAP IN PLACE TO RIGHT THIGH. LUNGS
STILL WITH FINE CRACKLES IN BASES. ABD IS LARGE AND SOFT WITH BM TODAY,
GENERALIOZED BODY EDEMA NOTED. ASSISTED PATIENT BACK TO BED. POSITIONED ON
LEFT SIDE. SAFETY PRECAUTIONS REINFORCED. CALL LIGHT IN REACH. WILL CONT TO
MONITOR.

## 2020-11-03 NOTE — NUR
PATIENT SITTING UP ON BEDSIDE, O2 ON 3 LITERS, PATIENT STATE HER PAIN LEVEL IS
A "O" OUT OF 0-10 PAIN SCALE. WHITE CATH REMAINS IN PLACE AND DRAINGING YELLOW
URINE AT THIS TIME. SIDERAILS ARE UP X2 CALL LIGHT IS WITHIN REACH AND PATIENT
DENIES ALL OTHER NEEDS AT THIS TIME.

## 2020-11-03 NOTE — NUR
PATIENT RESTING IN BED POSITIONED ON LEFT SIDE WITH HOB ELEVATED AND O2 VIA
NASAL CANNULA IN PLACE. ZOSYN HUNG AS ORDERED VIA RAC SITE. SITE STILL WITH
GOOD BLOOD RETURN. WHITE PATENT AND DRAING CLEAR YELLOW URINE. NO COMPLAINTS
AT THIS TIME. CALL LIGHT IN REACH. WILL CONT TO MONITOR.

## 2020-11-04 VITALS — DIASTOLIC BLOOD PRESSURE: 94 MMHG | SYSTOLIC BLOOD PRESSURE: 142 MMHG

## 2020-11-04 VITALS — SYSTOLIC BLOOD PRESSURE: 99 MMHG | DIASTOLIC BLOOD PRESSURE: 56 MMHG

## 2020-11-04 VITALS — SYSTOLIC BLOOD PRESSURE: 125 MMHG | DIASTOLIC BLOOD PRESSURE: 71 MMHG

## 2020-11-04 VITALS — SYSTOLIC BLOOD PRESSURE: 155 MMHG | DIASTOLIC BLOOD PRESSURE: 87 MMHG

## 2020-11-04 VITALS — DIASTOLIC BLOOD PRESSURE: 71 MMHG | SYSTOLIC BLOOD PRESSURE: 130 MMHG

## 2020-11-04 VITALS — DIASTOLIC BLOOD PRESSURE: 81 MMHG | SYSTOLIC BLOOD PRESSURE: 149 MMHG

## 2020-11-04 LAB
ALBUMIN SERPL-MCNC: 4.2 G/DL (ref 3.2–5)
ALP SERPL-CCNC: 97 U/L (ref 38–126)
ANION GAP SERPL CALCULATED.3IONS-SCNC: 18 MMOL/L
AST SERPL-CCNC: 29 U/L (ref 9–36)
BUN SERPL-MCNC: 56 MG/DL (ref 8–23)
BUN/CREAT SERPL: 38
CHLORIDE SERPL-SCNC: 98 MMOL/L (ref 95–108)
CO2 SERPL-SCNC: 26 MMOL/L (ref 22–30)
CREAT SERPL-MCNC: 1.5 MG/DL (ref 0.5–1)
ERYTHROCYTE [DISTWIDTH] IN BLOOD BY AUTOMATED COUNT: 17.1 % (ref 11.5–15.5)
HCT VFR BLD AUTO: 47.8 % (ref 37–47)
HGB BLD-MCNC: 14.3 G/DL (ref 12–16)
MCH RBC QN AUTO: 30.6 PG  CALC (ref 26–32)
MCHC RBC AUTO-ENTMCNC: 29.9 G/DL CAL (ref 32–36)
MCV RBC AUTO: 102.1 FL  CALC (ref 80–100)
PLATELET # BLD AUTO: 302 THOU/UL (ref 130–400)
POTASSIUM SERPL-SCNC: 5.2 MMOL/L (ref 3.5–5.1)
PROT SERPL-MCNC: 7.2 G/DL (ref 6.3–8.2)
RBC # BLD AUTO: 4.68 MILL/UL (ref 4.2–5.6)
SODIUM SERPL-SCNC: 137 MMOL/L (ref 137–146)

## 2020-11-04 NOTE — NUR
PATIENT AWAKE ALERT AND ORIENTEDX3. PATIENT REMAINS SOB AT REST.O2 IN PLACE.
BS-205-MEDICATED WITH NOVALOG 2 UNITS SQ PER SLIDING SCALE COVERAGE. LEVEMIR
32UNITS SQ GIVEN AS SCHEDULED. PROVIDED WITH HS SNACK. PATIENT MEDICATED WITH
ULTRAM 50MG PO FOR 6/10 JOINT PAIN. MEDICATED WITH SONATA 5MG PO FOR SLEEP.
SAFETY PRECAUTIONS REINFORCED. CALL LIGHT IN REACH. WILL CONT TO MONITOR.

## 2020-11-04 NOTE — NUR
REPORTS THAT TYLENOL WAS EFFECTIVE AND PAIN HAS DECREASED TO A 3/10. AGAIN
RESTING ON LEFT SIDE AFTER ASSISTANCE PROVIDED FOR POSITION CHANGE. ZOSYN
INFUSING AT THIS TIME.

## 2020-11-04 NOTE — NUR
PATIENT RESTING IN BED WITH HOB ELEVATED AND POSITIONED ON LEFT SIDE. O2 VIA
NASAL CANNULA IN PLACE AT 3LPM. TELE MONITOR IN PLACE. WHITE CATH PATENT AND
DRAINING YELLOW URINE. SALINE LOCK INTACT TO RAC-SITE REMAINS HEALTHY AT THIS
TIME. CALL LIGHT IN REACH. WILL CONT TO MONITOR.

## 2020-11-04 NOTE — NUR
TYLENOL GIVEN FOR 4/10 PAIN. PT SITTING UP ON EDGE OF BED REQUESTING BREATHING
TREATMENT FOR SOB. RT AT BEDSIDE.

## 2020-11-04 NOTE — NUR
ULTRAM GIVEN FOR 8/10 PAIN TO LEFT KNEE AND LOWER BACK. BACK IN BED RESTING ON
LEFT SIDE. ZOSYN INFUSING AT THIS TIME; IV SITE FLUSHES WELL. CALL LIGHT
WITHIN REACH.

## 2020-11-04 NOTE — NUR
REPORT RECEIVED FROM ANTONIA BELTRÁN. PT RESTING IN BED SEMI FOWLERS WITH EYES
CLOSED AND NO SIGNS OF DISTRESS. CALL LIGHT WITHIN REACH.

## 2020-11-04 NOTE — NUR
SITTING UP ON EDGE OF BED. DR. MILNER AT BEDSIDE ALONG WITH JENNIFER AGUILAR. C/O
8/10 KNEE, ANKLE, AND LOWER BACK PAIN. RESPIRATIONS SLIGHTLY LABORED ON OXYGEN
3L; PT REPORTS WEARING OXGYEN AT HOME 2L DURING THE DAY, 3L DURING THE NIGHT,
AND CPAP AT NIGHT. REQUESTING TYLENOL FOR PAIN.

## 2020-11-04 NOTE — NUR
SITTING UP IN BEDSIDE CHAIR FOR LUNCH; 1 UNIT OF NOVOLOG GIVEN FOR ACCU CHECK
. PT SOB WITH EXERTION; REMAINS ON OXYGEN 3. MILD REDNESS NOTED TO BACK
AND BUTT; REPOSITIONING ENCOURAGED; FACIAL FLUSHING ALSO WITH EXERTION.

## 2020-11-04 NOTE — NUR
PATIENT SITTING ON THE SIDE OF THE BED-AWAKE ALERT AND ORIENTEDX3. PATIENT
STATES THAT SHE DID SLEEP WELL FOR A COUPLE HOURS TONIGHT. O2 VIA NASAL
CANNULA IN PLACE. STILL SOB MOST OF THE TIME. WHITE CATH PATENT AND DRAINED
1000CC OF YELLOW URINE. SALINE LOCK TO RAC INTACT AND REMAINS HEALTHY AT THIS
TIME. SAFETY PRECAUTIONS REINFORCED. CALL LIGHT IN REACH. WILL CONT TO
MONITOR.

## 2020-11-05 VITALS — SYSTOLIC BLOOD PRESSURE: 123 MMHG | DIASTOLIC BLOOD PRESSURE: 72 MMHG

## 2020-11-05 VITALS — SYSTOLIC BLOOD PRESSURE: 108 MMHG | DIASTOLIC BLOOD PRESSURE: 67 MMHG

## 2020-11-05 VITALS — DIASTOLIC BLOOD PRESSURE: 66 MMHG | SYSTOLIC BLOOD PRESSURE: 116 MMHG

## 2020-11-05 VITALS — DIASTOLIC BLOOD PRESSURE: 75 MMHG | SYSTOLIC BLOOD PRESSURE: 124 MMHG

## 2020-11-05 VITALS — SYSTOLIC BLOOD PRESSURE: 121 MMHG | DIASTOLIC BLOOD PRESSURE: 72 MMHG

## 2020-11-05 VITALS — DIASTOLIC BLOOD PRESSURE: 69 MMHG | SYSTOLIC BLOOD PRESSURE: 115 MMHG

## 2020-11-05 LAB
ANION GAP SERPL CALCULATED.3IONS-SCNC: 16 MMOL/L
BUN SERPL-MCNC: 63 MG/DL (ref 8–23)
BUN/CREAT SERPL: 38
CHLORIDE SERPL-SCNC: 99 MMOL/L (ref 95–108)
CO2 SERPL-SCNC: 29 MMOL/L (ref 22–30)
CREAT SERPL-MCNC: 1.7 MG/DL (ref 0.5–1)
POTASSIUM SERPL-SCNC: 5.6 MMOL/L (ref 3.5–5.1)
SODIUM SERPL-SCNC: 138 MMOL/L (ref 137–146)

## 2020-11-05 NOTE — NUR
PT ASSISTED TO SIT UP ON SIDE OF BED; NO COMPLAINTS OR CONCERNS VOICED AT THIS
TIME; CALL BELL WITHIN REACH; WILL CONTINUE TO MONITOR.

## 2020-11-05 NOTE — NUR
RESTING IN BED ON LEFT SIDE. ZOSYN INFUSING AT THIS TIME. ULTRAM GIVEN FOR
4/10 PAIN TO LOWER EXTREMITIES AND NECK.

## 2020-11-05 NOTE — NUR
REPORT RECEIVED FROM ANTONIA BELTRÁN. PT PLACED CALL LIGHT ON REQUESTING ASSISTANCE
WITH POSITION CHANGE FROM LEFT SIDE LYING TO SITTING UP ON EDGE OF BED; PT
NEEDS ASSISTANCE WITH MOVING LEGS AND PULLING HERSELF UP. REPORTS THAT HER
DAUGHTER AND SON IN LAW ASSIST HER AT HOME. C/O 3/10 GENERALIZED PAIN TO LEGS,
KNEES, AND SHOULDERS. LUNGS DIMINISHED; RESPIRATIONS EVEN AND UNLABORED ON 3L
VIA NC; SOB WITH EXERTION SITTING UP. EDEMA IMPROVED AND NOW TRACE IN
BILATERAL ANKLES. POC REVIEWED. PT ENCOURAGED TO VERBALIZE CONCERNS. STATES
UNDERSTANDING. SAFETY MEASURES IN PLACE. CALL LIGHT WITHIN REACH.

## 2020-11-05 NOTE — NUR
DAUGHTER AT BEDSIDE TO ASSIST WITH FULL SHOWER. LINENS CHANGED AND PT BRUSHED
HER TEETH. STATES THAT SHE FEELS MUCH BETTER. ACCU CHECK 139. IV SITE APPEARS
HEALTHY AND FLUSHES. TELE REATTACHED.

## 2020-11-05 NOTE — NUR
PATIENT APPEARS SLEEPING AT THIS TIME WITH HOB ELEVATED AND O2 VIA NASAL
CANNULA IN PLACE. PATIENT CONT TO HAVE LABORED BREATHING MOST OF THE TIME.
EYES CLOSED. WHITE PATENT AND DRAINING YELLOW URINE. IV SITE TO RIGHT AC
INTACT. TELE MONITOR IN PLACE. CALL LIGHT IN REACH. WILL CONT TO MONITOR.

## 2020-11-05 NOTE — NUR
PT C/O INCREASED SOB. ASSISTED WITH REPOSITIONING TO A SITTING POSITION AND
EDUCATED ON BREATHING TECHNIUQES. RT CALLED TO ADMINISTER BREATHING TREATMENT.

## 2020-11-05 NOTE — NUR
PATIENT RESTING IN BED. O2 IN PLACE. STILL SOB AT REST. HOB ELEVATED. ZOSYN
HUNG AS ORDERED VIA RAC SITE-SITE REMAINS HEALTHY. WHITE CATH PATENT AND
DRAINING YELLOW URINE. CALL LIGHT IN REACH. WILL CONT TO MONITOR.

## 2020-11-05 NOTE — NUR
PT SITTING ON SIDE OF BED; PT A/O X3; O2 @ 3L VIA NC; LUNGS CLEAR/CRACKLES AT
THE BASES; TELE MONITOR IN PLACE, AFIB 90s PER ED MONITORING; WHITE DRAINING
CLEAR YELLOW URINE TO BEDSIDE DRAIN; NO COMPLAINTS OR CONCERNS VOICED; CALL
BELL WITHIN REACH; WILL CONTINUE TO MONITOR.

## 2020-11-06 VITALS — SYSTOLIC BLOOD PRESSURE: 117 MMHG | DIASTOLIC BLOOD PRESSURE: 54 MMHG

## 2020-11-06 VITALS — SYSTOLIC BLOOD PRESSURE: 124 MMHG | DIASTOLIC BLOOD PRESSURE: 77 MMHG

## 2020-11-06 VITALS — DIASTOLIC BLOOD PRESSURE: 65 MMHG | SYSTOLIC BLOOD PRESSURE: 130 MMHG

## 2020-11-06 VITALS — SYSTOLIC BLOOD PRESSURE: 115 MMHG | DIASTOLIC BLOOD PRESSURE: 76 MMHG

## 2020-11-06 VITALS — SYSTOLIC BLOOD PRESSURE: 134 MMHG | DIASTOLIC BLOOD PRESSURE: 79 MMHG

## 2020-11-06 VITALS — SYSTOLIC BLOOD PRESSURE: 144 MMHG | DIASTOLIC BLOOD PRESSURE: 84 MMHG

## 2020-11-06 VITALS — SYSTOLIC BLOOD PRESSURE: 127 MMHG | DIASTOLIC BLOOD PRESSURE: 71 MMHG

## 2020-11-06 LAB
ALBUMIN SERPL-MCNC: 4.1 G/DL (ref 3.2–5)
ALP SERPL-CCNC: 76 U/L (ref 38–126)
ANION GAP SERPL CALCULATED.3IONS-SCNC: 17 MMOL/L
AST SERPL-CCNC: 40 U/L (ref 9–36)
BUN SERPL-MCNC: 68 MG/DL (ref 8–23)
BUN/CREAT SERPL: 40
CHLORIDE SERPL-SCNC: 97 MMOL/L (ref 95–108)
CO2 SERPL-SCNC: 31 MMOL/L (ref 22–30)
CREAT SERPL-MCNC: 1.7 MG/DL (ref 0.5–1)
ERYTHROCYTE [DISTWIDTH] IN BLOOD BY AUTOMATED COUNT: 17.2 % (ref 11.5–15.5)
HCT VFR BLD AUTO: 47.5 % (ref 37–47)
HGB BLD-MCNC: 13.7 G/DL (ref 12–16)
MCH RBC QN AUTO: 29.8 PG  CALC (ref 26–32)
MCHC RBC AUTO-ENTMCNC: 28.8 G/DL CAL (ref 32–36)
MCV RBC AUTO: 103.3 FL  CALC (ref 80–100)
PLATELET # BLD AUTO: 272 THOU/UL (ref 130–400)
POTASSIUM SERPL-SCNC: 5.7 MMOL/L (ref 3.5–5.1)
PROT SERPL-MCNC: 6.9 G/DL (ref 6.3–8.2)
RBC # BLD AUTO: 4.6 MILL/UL (ref 4.2–5.6)
SODIUM SERPL-SCNC: 139 MMOL/L (ref 137–146)

## 2020-11-06 NOTE — NUR
RECIEVED REPORT FROM MISTY MCMANUS. PT RESTING IN SEMI FOWLERS POSITION UPON
ENTERING ROOM. INTRODUCED SELF TO PT AND DISCUSSED POC. PT IS A/OX3.
ASSESSMENT AND VITALS COMPLETED. RESPIRATIONS ARE SHALLOW ON 3L NC. LUNG
SOUNDS ARE CLEAR.HEART RHYTHM IS NORMAL WITH TELE IN PLACE, AFIB 92 PER ER
MONITORING. BOWEL SOUNDS ARE ACTIVE IN ALL QUADRANTS, LAST REPORTED BM
11/06/2020. RADIAL PULSES STRONG. PEDAL PULSES WEAK. WHITE CATHATER IN PLACE
WITH CLEAR YELLOW URINE. TUBING UNOBSTRUCTED FLOWING WITH GRAVITY. PT
COMPLAINS OF 4/10 GENERALIZED BODY PAIN, ULTRAM TO BE ADMINISTERED. PT DENIES
OF ANY ADDITIONAL NEEDS AT THIS TIME. ALL  SAFTEY PRECAUTIONS REMAIN IN PLACE
WITH CALL LIGHT IN REACH. WILL CONTINUE TO MONITOR.

## 2020-11-06 NOTE — NUR
PT MEDICATED AS ORDERED FOR C/O GENERALIZED PAIN; PT REQUEST ALBUTEROL INHALER
FOR C/O SOB; CALL BELL WITHIN REACH; WILL CONTINUE TO MONITOR.

## 2020-11-06 NOTE — NUR
PT RESTING IN SEMI FOWLERS POSITION;RESPIRATIONS REMAIN LABORED ON O2 @ 3L VIA
NC;PT REPORTED GENERALIZED PAIN RATING 7/10 ON THE PAIN SCALE AND WAS
MEDICATED WITH PRN ULTRAM 50MG PO;IV SITE REMAINS PATENT;TELE MONITORING IN
PLACE;ACCUCHECK 93, NO COVERAGE NEEDED;WHITE CATHETER REMAINS PATENT DRAINING
TO GRAVITY WITH EASE;ASSESSMENT REMAINS UNCHANGED AT THIS TIME;ENCOURAGED TO
CALL FOR ASSISTANCE IF NEEDED;FALL PRECAUTIONS IN PLACE WITH BED IN THE LOWEST
POSITION AND CALL LIGHT IN REACH;WILL CONTINUE TO MONITOR

## 2020-11-06 NOTE — NUR
PT RESTING AT BEDSIDE,A&O X3;VS OBTAINED AND ASSESSMENT COMPLETED;PT DENIES
ANY CURRENT PAIN OR DISCOMFORTS,PAIN SCALE AND REPORTING EDUCATED;RESPIRATIONS
LABORED ON O2 @3L VIA NC,NON-PRODUCTIVE COUGH NOTED;ABDOMEN DISTENDED/FIRM ON
PALPATION AND ACTIVE IN ALL 4 QUADRANTS, MOM TO BE ADMINISTERED TO ASSIST IN
BOWEL CARE;WHITE CATHETER DRAINING CLEAR/YELLOW URINE TO GRAVITY WITH
EASE;WEAK PEDAL PULSES;MULTIPLE HEALING SKIN TEARS NOTED THROUGHOUT;TELE
MONITORING IN PLACE;ACCUCHECK 111, NO COVERAGE NEEDED;#20G TO RAC FLUSHED AND
PATENT,SITE APPEARS HEALTHY;PT DENIES ANY ADDITIONAL NEEDS AT THIS TIME AND IS
ENCOURAGED TO CALL FOR ASSISTANCE IF NEEDED;FALL PRECAUTIONS IN PLACE WITH BED
IN THE LOWEST POSITION AND CALL LIGHT IN REACH;WILL CONTINUE TO MONITOR

## 2020-11-06 NOTE — NUR
PT RESTING IN SEMI FOWLERS POSITION;RESPIRATIONS EVEN AND UNLABORED ON O2 @ 3L
VIA NC;PT REPORTS NAUSEA HAS SUBSIDED SINCE ZOFRAN ADMINSTRATION;PT DENIES ANY
CURRENT PAIN OR DISCOMFORTS;TELE MONITORING IN PLACE;IV SITE PATENT;WHITE
CATHETER CONTINUES TO DRAIN TO GRAVITY WITH EASE;ALL SAFETY PRECAUTIONS REMAIN
IN PLACE WITH BED IN THE LOWEST POSITION AND CALL LIGHT IN REACH;WILL CONTINUE
TO MONITOR

## 2020-11-07 VITALS — SYSTOLIC BLOOD PRESSURE: 118 MMHG | DIASTOLIC BLOOD PRESSURE: 72 MMHG

## 2020-11-07 VITALS — DIASTOLIC BLOOD PRESSURE: 49 MMHG | SYSTOLIC BLOOD PRESSURE: 100 MMHG

## 2020-11-07 VITALS — SYSTOLIC BLOOD PRESSURE: 134 MMHG | DIASTOLIC BLOOD PRESSURE: 78 MMHG

## 2020-11-07 VITALS — SYSTOLIC BLOOD PRESSURE: 126 MMHG | DIASTOLIC BLOOD PRESSURE: 51 MMHG

## 2020-11-07 VITALS — DIASTOLIC BLOOD PRESSURE: 68 MMHG | SYSTOLIC BLOOD PRESSURE: 103 MMHG

## 2020-11-07 NOTE — NUR
PATIENT C/O NAUSEA AT THIS TIME 4MG OF ZOFRAN GIVEN IV AT THIS TIME. PATIENT
ASSISTED TO SETTING UP AT BEDSIDE. CALL LIGHT WITHIN REACH.

## 2020-11-07 NOTE — NUR
PATIENT SITTING UP AT BEDSIDE REQUESTED A "COOKIE" FOR HER BLOOD GLUCOSE LEVEL
THAT WAS 73. PATIENT WAS GIEN ORANGE JUICE AND A GRANOLA BAR AT THIS TIME.
PATIENT O2 REMAINS ON AT THIS TIME. SIDERAILS UP AND CALL LIGHT WITHIN REACH.

## 2020-11-07 NOTE — NUR
PATIENT RESTING IN BED WITH HOB ELEVATED AND O2 VIA NASAL CANNULA IN PLACE.
PATIENT IS SOB AT REST. ALERT AND ORIENTEDX3. PATIENT WITH WHITE CATH IN
PLACE AND DRAINING CLEAR YELLOW URINE. SALINE LOCK TO RAC-PATIENT IS REFUSING
TO HAVE SITE CHANGED EVEN THOUGH IT IS OUTDATED. NO REDNESS SWELLING OR
TENDERNESS NOTED AT THE SITE. PATIENT IS MAX ASSIST AND NEEDS ASSISTANCE
FREQUENTY TO CHANGE POSITION. TELE MONITOR IN PLACE-CHRONIC A-FIB. SAFETY
PRECAUTIONS REINFORCED. CALL LIGHT IN REACH. WILL CONT TO MONITOR.

## 2020-11-07 NOTE — NUR
PT RESTING IN SEMI FOLWERS POSITION. RESPIRATIONS ARE EVEN AND UNLABORED WITH
NO SIGNS OF DISTRESS NOTED. NO SIGNS OF ANY DISCOMFORTS AT THIS TIME. ALL
SAFTEY PRECAUTIONSA RE IN PLACE WITH CALL LIGHT IN REACH. WILL CONTINUE TO
MONITOR

## 2020-11-07 NOTE — NUR
PT REPORTS NAUSEA UNRELIEVED BACK PRN ZOFRAN.MD NOTIFIED AND NEW ORDER
RECEIVED FROM EKG AND PHENERGAN IVP TO BE ADMINISTERED.RT AT BEDSIDE OBTAINING
EKG AT THIS TIME;WILL CONTINUE TO MONITOR

## 2020-11-07 NOTE — NUR
Physical Therapy Note
 
Patient identified by name and date of birth
 
Patient refused physical therapy today. Patient expressed that she is still
not yet ready and she aches at the neck, shoulder and back. Physical therapy
will be rescheduled next week.

## 2020-11-07 NOTE — NUR
PATEINT SITTING UP ON SIDE OF THE BED. PATIENT STATES SHE IS "JUST A LITTLE
ANXIOUS" VERBALIZED CLAMING TECHINIQUES WITH PATIENT REASSURED PATIENT OF CARE
OF THE DAY PATIENT STATED "THANK YOU" YOU MAKE ME FEEL BETTER". REPOSITIONED
PATIENT. PATIENT STATED HER PAIN LEVEL IS 5 "BUT CAN LIVE WITH THAT". WHITE
INTACT AND DRAINING CLEAR YELLOW URINE. PATIENT IV INTACT AND DUE TO BE
CHANGED TODAY. PATIENT REFUSING TO HAVE IV CHANGED, IV FLUSED AND NO S/S OF IV
INFECTION NOTED. PATIENT O2 ON AT 3 LITERS BREATHING IS NON-LABORED AT THIS
TIME. CALL LIGHT WITHIN REACH PATIENT STATES NO OTHER NEEDS AT THIS TIME.

## 2020-11-07 NOTE — NUR
PATIENT IN BED RESTING ON LEFT SIDE DAUGHTER AT BEDSIDE. PATIENT DENIES ANY
NEEDS AT THIS TIME. CALL LIGHT IS WITHIN REACH, SIDERAILS UP TIMES 2. WHITE
CATH REMAINS PATENT AND DRAINING CLEAR YELLOW URINE. PATIENT DENIES ANY NAUSEA
AT THIS TIME.

## 2020-11-07 NOTE — NUR
PATIENT ASSISTED WITH REPOSITIONING IN BED-HOB ELEVATED FOR COMFORT-O2 VIA
NASAL CANNULA IN PLACE. ACCU-CHECK-130. HS SNACK PROVIDED AND PATIENT DID EAT
IT. MEDICATED WITH LEVEMIR 32UNITS AS ORDERED. MEDICATED FOR GENERALIZED PAIN
WITH TYLENOL 650MG PO FOR PAIN AND WITH SONATA 5MG PO FOR SLEEP. LASIX 40MG
IVP GIVEN AS SCHEDULED. SAFETY PRECAUTIONS REINFORCED. CALL LIGHT IN REACH.
WILL CONT TO MONITOR.

## 2020-11-07 NOTE — NUR
PT SITTING UP ON SIDE OF BED. RESPIRATIONS ARE SHALLOW. PT REQUEST BREATHING
TREATMENT AT THIS TIME. RT NOTIFIED. PT COMPLAINS OF CRAMPING IN LEG BUT
REFUSES MEDICATION.ALL SAFETY PRECAUTIONS ARE IN PLACE WITH CALL LIGHT IN
REACH. WILL CONTINUE TO MONITOR

## 2020-11-07 NOTE — NUR
PATIETN C/O NAUSEA AND PHENERGAN 25MG IV GIVEN AT THIS TIME. PATIENT LAYING IN
BED WITH SIDERAILS UP CALL LIGHT WITHIN REACH.

## 2020-11-08 VITALS — SYSTOLIC BLOOD PRESSURE: 160 MMHG | DIASTOLIC BLOOD PRESSURE: 90 MMHG

## 2020-11-08 VITALS — SYSTOLIC BLOOD PRESSURE: 114 MMHG | DIASTOLIC BLOOD PRESSURE: 62 MMHG

## 2020-11-08 VITALS — DIASTOLIC BLOOD PRESSURE: 82 MMHG | SYSTOLIC BLOOD PRESSURE: 123 MMHG

## 2020-11-08 VITALS — DIASTOLIC BLOOD PRESSURE: 84 MMHG | SYSTOLIC BLOOD PRESSURE: 130 MMHG

## 2020-11-08 VITALS — DIASTOLIC BLOOD PRESSURE: 72 MMHG | SYSTOLIC BLOOD PRESSURE: 142 MMHG

## 2020-11-08 VITALS — DIASTOLIC BLOOD PRESSURE: 79 MMHG | SYSTOLIC BLOOD PRESSURE: 137 MMHG

## 2020-11-08 LAB
ANION GAP SERPL CALCULATED.3IONS-SCNC: 18 MMOL/L
BUN SERPL-MCNC: 88 MG/DL (ref 8–23)
BUN/CREAT SERPL: 44
CHLORIDE SERPL-SCNC: 99 MMOL/L (ref 95–108)
CO2 SERPL-SCNC: 25 MMOL/L (ref 22–30)
CREAT SERPL-MCNC: 2 MG/DL (ref 0.5–1)
MAGNESIUM SERPL-MCNC: 2.2 MG/DL (ref 1.6–2.3)
POTASSIUM SERPL-SCNC: 6 MMOL/L (ref 3.5–5.1)
SODIUM SERPL-SCNC: 136 MMOL/L (ref 137–146)

## 2020-11-08 NOTE — NUR
PATIENT APPEARS SLEEPING AT THIS TIME WITH HOB ELEVATED AND O2 VIA NASAL
CANNULA IN PLACE. TELE MONITOR IN PLACE. WHITE CATH PATENT AND DRAINING YELLOW
URINE. SALINE LOCK INTACT TO RAC. CALL LIGHT IN REACH. WILL CONT TO MONITOR.

## 2020-11-08 NOTE — NUR
PATIENT MAX ASSIST OOB TO BSC-LARGE FORMED BROWN BM. PERICARE AND WHITE CATH
CARE WAS DONE WITH SOAP AND WATER. PATIENT MAX ASSIST BACK TO BED.
MAX ASSIST TO REPOSITION PATIENT ON LEFT SIDE. HOB ELEVATED. O2 VOIA NASAL
CANNULA IN PLACE. TELE MONITOR IN PLACE. WHITE CATH PATENT AND DRAINING YELLOW
URINE. SAFETY PRECAUTIONS REINFORCED. CALL LIGHT IN REACH. WILL CONT TO
MONITOR.

## 2020-11-08 NOTE — NUR
PATIENT UP IN CHAIR AT THIS TIME 02 ON AT 2 LITERS. CALL LIGHT WITHIN REACH.
PATIENT STATES HER PAIN IS "DO-ABLE" AND IS A 3 OUT OF A PAIN SCALE OF 0-10.
WHITE CATH REMAINS PATENT AND DRAINING CLEAR YELLOW URINE AT THIS TIME.
PATIENT DENIES ANY OTHER NEEDS.

## 2020-11-08 NOTE — NUR
PATIENT SITTING UP ON SIDE OF BED. STATES PAIN IS A 4 OUT OF A SCALE OF 0-10.
PATIENT REPOSITIONED WITH HELP OF NURSE AND DAUGHTER WHO IS AT BEDSIDE.
PATIENT DENIES ANY NEEDS AND STATES SHE "SLEPT SO WELL LAST NIGHT". PATIENT
STATED THAT DAUGHTER RUBBED SOME CREAM ON HER NECK AND HELPED CLEAN HER UP.
WHITE REMAINS PATENT AND DRAINING CLEAR YELLOW URINE. PATIENT REFUSES TO LET
NURSE TRY AND RESTART IV, PATIENT STATES SHE IS A HARD STICK. SAFETY MEASURES
IN PLACE CALL LIGHT NEAR. SIDERAILS UP ON BEDX2.

## 2020-11-08 NOTE — NUR
PATIENT IS POSITIONED ON LEFT SIDE WITH HOB SLIGHTLY ELEVATED AND O2 VIA NASAL
CANNULA IN PLACE. ZOSYN HAS FINISHED AND SALINE LOCK TO RAC SITE FLUSHED.
PATIENT WITH EYES CLOSED AND APPEARS SLEEPING. CALL LIGHT IN REACH. WILL CONT
TO MONITOR.

## 2020-11-08 NOTE — NUR
PATIENT RESTING IN BED WITH VISITOR AT BEDSIDE. PATIENT IS AWAKE ALERT AND
ORIENTEDX3. O2 VIA NASAL CANNULA IN PLACE. HOB ELEVATED FOR COMFORT. PATIENT
REPOSITIONED ON LEFT SIDE. WHITE CATH PATENT AND DRAINING YELLOW URINE. TELE
MONITOR IN PLACE. SALINE LOCK TO RAC INTACT-PATIENT CONT TO REFUSE TO HAVE
SITE CHANGED EVEN THOUGH IT IS OUTDATED. SAFETY PRECAUTIONS REINFORCED. CALL
LIGHT IN REACH. WILL CONT TO MONITOR,

## 2020-11-08 NOTE — NUR
PATIENT IN BED AWAKE ALERT AND ORIENTED AT THIS TIME. PATIENT DENIES ANY
NEEDS. NURSE ASSESSEMENT DONE AT THIS TIME CALL LIGHT NEAR SIDERAILS UP 2.
PATIENT STATES THAT PAIN "ISN'T THAT BAD TODAY" AND IS A 4 OUT OF A SCALE OF
0-10.

## 2020-11-09 VITALS — DIASTOLIC BLOOD PRESSURE: 76 MMHG | SYSTOLIC BLOOD PRESSURE: 131 MMHG

## 2020-11-09 VITALS — DIASTOLIC BLOOD PRESSURE: 79 MMHG | SYSTOLIC BLOOD PRESSURE: 139 MMHG

## 2020-11-09 VITALS — SYSTOLIC BLOOD PRESSURE: 136 MMHG | DIASTOLIC BLOOD PRESSURE: 87 MMHG

## 2020-11-09 VITALS — SYSTOLIC BLOOD PRESSURE: 129 MMHG | DIASTOLIC BLOOD PRESSURE: 79 MMHG

## 2020-11-09 VITALS — SYSTOLIC BLOOD PRESSURE: 144 MMHG | DIASTOLIC BLOOD PRESSURE: 76 MMHG

## 2020-11-09 LAB
ANION GAP SERPL CALCULATED.3IONS-SCNC: 17 MMOL/L
BASOPHILS NFR BLD AUTO: 0 % (ref 0–3)
BUN SERPL-MCNC: 89 MG/DL (ref 8–23)
BUN/CREAT SERPL: 49
CHLORIDE SERPL-SCNC: 96 MMOL/L (ref 95–108)
CO2 SERPL-SCNC: 32 MMOL/L (ref 22–30)
CREAT SERPL-MCNC: 1.8 MG/DL (ref 0.5–1)
EOSINOPHIL NFR BLD AUTO: 1 % (ref 0–8)
ERYTHROCYTE [DISTWIDTH] IN BLOOD BY AUTOMATED COUNT: 16.7 % (ref 11.5–15.5)
HCT VFR BLD AUTO: 48.7 % (ref 37–47)
HGB BLD-MCNC: 14.6 G/DL (ref 12–16)
IMM GRANULOCYTES NFR BLD: 0.7 % (ref 0–5)
LYMPHOCYTES NFR BLD: 10 % (ref 15–41)
MAGNESIUM SERPL-MCNC: 2.3 MG/DL (ref 1.6–2.3)
MCH RBC QN AUTO: 30.9 PG  CALC (ref 26–32)
MCHC RBC AUTO-ENTMCNC: 30 G/DL CAL (ref 32–36)
MCV RBC AUTO: 103.2 FL  CALC (ref 80–100)
MONOCYTES NFR BLD AUTO: 19 % (ref 2–13)
NEUTROPHILS # BLD AUTO: 7.46 THOU/UL (ref 2–7.15)
NEUTROPHILS NFR BLD AUTO: 69 % (ref 42–76)
PLATELET # BLD AUTO: 257 THOU/UL (ref 130–400)
POTASSIUM SERPL-SCNC: 5.6 MMOL/L (ref 3.5–5.1)
RBC # BLD AUTO: 4.72 MILL/UL (ref 4.2–5.6)
SODIUM SERPL-SCNC: 139 MMOL/L (ref 137–146)

## 2020-11-09 NOTE — NUR
RECIEVED CRITICAL LAB RESULTS FROM [facility] IN LAB-BUN-89, CREAT-1.8, K+-5.6.
ALL RESULTS WERE TEXTED TO DR. MILNER. WILL CONT TO MONITOR.

## 2020-11-09 NOTE — NUR
PT LAYING IN BED. A&O X3. NO DISTRESS NOTED. O2 VIA NC @3L IN PLACE.
DIMINISHED BREATH SOUNDS HEARD UPON AUSCULTATION. WHITE CATHETER DRAINING VIA
GRAVITY WITH CLEAR YELLOW URINE NOTED. DENIES ANY PAIN AT THIS TIME.
ASSESSMENT COMPLETED. DISCUSSED POC. CALL LIGHT IN REACH. CONTINUE TO MONITOR.

## 2020-11-09 NOTE — NUR
PATIENT IS SITTING UP ON THE SIDE OF THE BED AT THIS TIME WITH O2 VIA NASAL
CANNULA IN PLACE. TELE MONITOR IN PLACE. PATIENT MEDICATED WITH XANAX 0.5MG PO
FOR ANXIETY. WHITE CATH IS PATENT AND DRAINING YELLOW URINE. CALL LIGHT IN
REACH. WILL CONT TO MONITOR,.

## 2020-11-09 NOTE — NUR
PATIENT REPOSITIONED IN BED ON LEFT SIDE AGAIN. ACCU-CHECK -PATIENT
MEDICATED WITH LEVEMIR 32UNITS SQ SCHEDULED AND NOVALOG 2UNITS PER SLIDING
SCALE COVERAGE. PATIENT PROVIDED WITH HS SNACK. SAFETY PRECAUTIONS REINFORCED.
CALL LIGHT IN REACH. WILL CONT TO MONITOR.

## 2020-11-09 NOTE — NUR
PATIENT SITTING UP ON THE SIDE OF THE BED-C/O SEVERE PAIN TO JOINTS AND
NECK-9/10 ON PAIN SCALE. ASKING FOR TYLENOL. MEDICATED WITH TYLENOL 650MG PO
FOR PAIN. CALL LIGHT IN REACH. WILL CONT TO MONITOR.

## 2020-11-09 NOTE — NUR
PATIENT APPEARS SLEEPING POSITIONED ON LEFT SIDE WITH EYES CLOSED AND O2 VIA
NASAL CANNULA IN PLACE. STILL WITH LABORED BREATHING AT REST. TELE MONITOR IN
PLACE. WHITE CATH PATENT AND DRAINING YELLOW URINE, SALINE LOCK TO RAC REMAINS
INTACT. CALL LIGHT IN REACH. WILL CONT TO MONITOR.

## 2020-11-09 NOTE — NUR
PATIENT RESTING IN BED WITH HOB ELEVATED AND POSITIONED ON LEFT SIDE WITH O2
VIA NASAL CANNULA IN PLACE. EYES ARE CLOSED AND APPEARS SLEEPING. PATIENT IS
SOB EVEN AT REST. TELE MONITOR IN PLACE. WHITE CATH PATENT AND DRAING YELLOW
URINE. SALINE LOCK TO RAC INTACT-PATIENT CONT TO REFUSE TO HAVE NEW IV SITE
STARTED EVEN AFTER BEING EDUCATED REGUARDING SITE BEING OUTDATED. STATES THAT
IT IS STILL WORKING AND YOU ARE NOT STICKING ME FOR ANOTHER ONE. CALL LIGHT IN
REACH. WILL CONT TO MONITOR.

## 2020-11-09 NOTE — NUR
PT WAS SEEN THIS AM FOR TDA/FUNCTIONAL ACTIVITY AND GT. SHE COMPLETED SUPINE
TO SIT ON EDGE OF BED WITH MIN A ON PULLING HER LEGS OFF THE EDGE OF THE BED.
SIT TO STAND WITH VERBAL CUES AND MOD A . IMMEDIATE STANDING BALANCE WAS GOOD.
PT THEN PROCEEDED TO TAKE AT LEAST 10 STEPS WITH RW. STEADY GAIT WAS OBSERVED,
HOWEVER, ENDURANCE LEVEL WAS POOR. SPO2 DROPPED FROM 91 TO 82% WITH 3L OF O2
AT THE END OF ACTIVITIES. PT WAS GIVEN MIN A TO ASSIST BACK IN BED, PREFERRED
TO BE SITTING ON EDGE OF BED WITH DAUGHTER IN THE ROOM WITH HER. NO ADVERSE
REACTIONS NOTED OR REPORTED AT THE END OF TX.
AMPAC: 11 POINTS.

## 2020-11-09 NOTE — NUR
PATIENT RESTING IN BED AT THIS TIME WITH HOB ELEVATED AND O2 VIA NASAL CANNULA
IN PLACE.  EYES ARE CLOSED AND APPEARS SLEEPING. PATIENT IS  SOB AT REST. TELE
MONITOR IN PLACE. WHITE CaTH PATENT AND DRAINING YELLOW URINE. SALINE LOCK TO
RIGHT AC-PATIENT CONT TO REFUSE TO HAVE NEW IV SITE STARTED EVEN AFTER BEING
EDUCATED REGUARDING SITE BEING OUTDATED. STATES THAT IT IS STILL WORKING AND
YOU ARE NOT STICKING ME FOR AOTHER ONE. CALL LIGHT IN REACH. WILL CONT TO
MONITOR.

## 2020-11-10 VITALS — SYSTOLIC BLOOD PRESSURE: 154 MMHG | DIASTOLIC BLOOD PRESSURE: 96 MMHG

## 2020-11-10 VITALS — DIASTOLIC BLOOD PRESSURE: 73 MMHG | SYSTOLIC BLOOD PRESSURE: 139 MMHG

## 2020-11-10 VITALS — DIASTOLIC BLOOD PRESSURE: 94 MMHG | SYSTOLIC BLOOD PRESSURE: 115 MMHG

## 2020-11-10 VITALS — SYSTOLIC BLOOD PRESSURE: 155 MMHG | DIASTOLIC BLOOD PRESSURE: 76 MMHG

## 2020-11-10 VITALS — DIASTOLIC BLOOD PRESSURE: 82 MMHG | SYSTOLIC BLOOD PRESSURE: 131 MMHG

## 2020-11-10 VITALS — DIASTOLIC BLOOD PRESSURE: 91 MMHG | SYSTOLIC BLOOD PRESSURE: 154 MMHG

## 2020-11-10 LAB
ALBUMIN SERPL-MCNC: 3.4 G/DL (ref 3.2–5)
ALP SERPL-CCNC: 56 U/L (ref 38–126)
ANION GAP SERPL CALCULATED.3IONS-SCNC: 13 MMOL/L
AST SERPL-CCNC: 35 U/L (ref 9–36)
BUN SERPL-MCNC: 79 MG/DL (ref 8–23)
BUN/CREAT SERPL: 54
CHLORIDE SERPL-SCNC: 98 MMOL/L (ref 95–108)
CO2 SERPL-SCNC: 31 MMOL/L (ref 22–30)
CREAT SERPL-MCNC: 1.5 MG/DL (ref 0.5–1)
ERYTHROCYTE [DISTWIDTH] IN BLOOD BY AUTOMATED COUNT: 16.5 % (ref 11.5–15.5)
HCT VFR BLD AUTO: 43.7 % (ref 37–47)
HGB BLD-MCNC: 13.4 G/DL (ref 12–16)
MCH RBC QN AUTO: 30.7 PG  CALC (ref 26–32)
MCHC RBC AUTO-ENTMCNC: 30.7 G/DL CAL (ref 32–36)
MCV RBC AUTO: 100 FL  CALC (ref 80–100)
PLATELET # BLD AUTO: 212 THOU/UL (ref 130–400)
POTASSIUM SERPL-SCNC: 4.7 MMOL/L (ref 3.5–5.1)
PROT SERPL-MCNC: 6 G/DL (ref 6.3–8.2)
RBC # BLD AUTO: 4.37 MILL/UL (ref 4.2–5.6)
SODIUM SERPL-SCNC: 137 MMOL/L (ref 137–146)

## 2020-11-10 NOTE — NUR
PATIENT SITTING UP ON THE SIDE OF THE BED WITH O2 VIA NASAL CANNULA IN PLACE.
STILL SOB AT REST. ACCU-CHECK-265 TONIGHT. MEDICATED WITH LEVEMIR 32UNITS SQ
AND NOVALOG 3UNITS SQ PER SLIDING SCALE COVERAGE PROTOCOL. PATIENT STATES THAT
SHE DID NOT REST WELL LAST NIGHT OR TODAY. MEDICATED WITH SONATA 5MG PO FOR
SLEEP. TELE MONITOR IN PLACE. WHITE PATENT AND DRAINING YELLOW URINE. HS SNACK
PROVIDED. SAFETY PRECAUTIONS REINFORCED. CALL LIGHT IN REACH. WILL CONT TO
MONITOR.

## 2020-11-10 NOTE — NUR
PT SITTING ON THE SIDE OF THE BED. A&O X3. NO DISTRESS NOTED. O2 VIA NC IN
PLACE @ 3L. WHITE CATHETER DRAINING VIA GRAVITY. PT C/O OF GENERALIZED PAIN.
NO OTHER NEEDS AT THIS TIME. ASSESSMENT COMPLETED. DISCUSSED POC. CALL LIGHT
IN REACH CONTINUE TO MONITOR.

## 2020-11-10 NOTE — NUR
PT note
Patient is able to work on supine to sit transfers and then performed repeated
sit to stand x 3. She is actually able to perfrom sit to stand with SBA of 1
and is able to ambulate to bedside chair x 10 feet with vitals stable.
She is improving with Am Pac of 13 . No change in discharge recommendations as
the patient Am Pac is only slightly improved

## 2020-11-10 NOTE — NUR
PT SITTING ON THE SIDE OF THE BED WITH DAUGHTER AT BEDSIDE. NECK MASSAGE GIVEN
TO PT. NO OTHER NEEDS AT THIS TIME. CALL LIGHT IN REACH. CONTINUE TO MONITOR.

## 2020-11-10 NOTE — NUR
PATIENT RESTING IN BED WITH HOB SLIGHTLY ELEVATED AND O2 VIA NASAL CANNULA IN
PLACE-POSITIONED ON LEFT SIDE. PATIENT IS STILL SOB EVEN AT REST. TELE MONITOR
IN PLACE. WHITE CAATH PATENT AND DRAINING YELLOW URINE. SALINE LOCK TO RAC
INTACT-PATIENT CONT TO REFUSE TO HAVE IV SITE CHANGED EVEN AFTER BEING
EDUCATED ABOUT IT BEING OUTDATED. CALL LIGHT IN REACH,WILL CONT TO MONITOR.

## 2020-11-10 NOTE — NUR
PATIENT UP AND DOWN MOST OF THE NIGHT IN BED ON LEFT SIDE THEN SITTING ON THE
SIDE OF THE BED AND THEN BACK IN BED. VERY RESTLESS TONIGHT. O2 VIA NASAL
CANNULA IN PLACE. WHITE PATENT AND DRAINING YELLOW URINE. TELE MONITOR IN
PLACE. SAFETY PRECAUTIONS REINFORCED. CALL LIGHT IN REACH. WILL CONT TO
MONITOR.

## 2020-11-11 VITALS — DIASTOLIC BLOOD PRESSURE: 79 MMHG | SYSTOLIC BLOOD PRESSURE: 139 MMHG

## 2020-11-11 VITALS — DIASTOLIC BLOOD PRESSURE: 85 MMHG | SYSTOLIC BLOOD PRESSURE: 142 MMHG

## 2020-11-11 VITALS — DIASTOLIC BLOOD PRESSURE: 74 MMHG | SYSTOLIC BLOOD PRESSURE: 141 MMHG

## 2020-11-11 VITALS — SYSTOLIC BLOOD PRESSURE: 140 MMHG | DIASTOLIC BLOOD PRESSURE: 80 MMHG

## 2020-11-11 VITALS — SYSTOLIC BLOOD PRESSURE: 131 MMHG | DIASTOLIC BLOOD PRESSURE: 65 MMHG

## 2020-11-11 VITALS — DIASTOLIC BLOOD PRESSURE: 87 MMHG | SYSTOLIC BLOOD PRESSURE: 143 MMHG

## 2020-11-11 LAB
ANION GAP SERPL CALCULATED.3IONS-SCNC: 11 MMOL/L
BUN SERPL-MCNC: 65 MG/DL (ref 8–23)
BUN/CREAT SERPL: 55
CHLORIDE SERPL-SCNC: 95 MMOL/L (ref 95–108)
CO2 SERPL-SCNC: 39 MMOL/L (ref 22–30)
CREAT SERPL-MCNC: 1.2 MG/DL (ref 0.5–1)
POTASSIUM SERPL-SCNC: 4.1 MMOL/L (ref 3.5–5.1)
SODIUM SERPL-SCNC: 141 MMOL/L (ref 137–146)

## 2020-11-11 NOTE — NUR
PATIENT IN BED AT THIS TIME ALERT AND ORIENTED DENIES ANY PAIN STATED SHE IS
"0" OUT OF A SCALE OF 0-10. PATIENT WHITE INTACT AND DRAINING AT THIS TIME
CLEAR YELLOW URINE. O2 ON AT 3 LITERS AND SPO2 90%. ALL SAFETY MEASURES ARE IN
PLACE CALL LIGHT NEAR SIDERAILS UP X 2

## 2020-11-11 NOTE — NUR
PT LAYING IN BED ON HER SIDE. SCHEDULED LEVEMIR AND SLIDING SCALE NOVOLOG
ADMINISTERED FOR GLUCOSE OF 210mg/dl. SEE E-MAR. PT OFFERED PRN ULTRAM OR
TYLENOL. PT DECLINES AT THIS TIME. CALL CRAWFORD REMAINS WITHIN REACH, AGREES TO
CALL PRN.

## 2020-11-11 NOTE — NUR
PT REQUEST ASSISTANCE TO SIT UP AT SIDE OF BED. STATES "I CANT BREATHE". NO
SIGNS OF INCREASED WORK OF BREATHING APPRECIATED. PT GUIDED THROUGH DEEP
BREATHING EXERCISES TO HELP WITH ANXIETY. OFFERED PT PRN XANAX WITH HER
MEDICATIONS, PT AGREES. SEE E-MAR. PT IS SITTING UP AT SIDE OF BED AND CALMER.
PHYSICAL ASSESMENT COMPLETE. PLAN OF CARE REVIEWED WITH PT. PT DENIES
QUESTIONS AND VERBALIZES UNDERSTANDING. PT DENIES FURTHER NEEDS AT THIS TIME.
CALL BELL WITHIN REACH, AGREES TO CALL PRN.

## 2020-11-11 NOTE — NUR
PATIENT RESTING IN BED POSITIONED ON LEFT SIDE WITH O2 VIA NASAL CANNULA IN
PLACE. HOB SLIGHTLY ELEVATED. RESP ARE SLIGHTLY LABORED AT REST. WHITE CATH
PATENT AND DRAINING YELLOW URINE. TELE MONITOR IN PLACE. CALL LIGHT IN REACH.
WILL CONT TO MONITOR.

## 2020-11-11 NOTE — NUR
PATIENT C/O GENERALIZED PAIN AND REQUEST TYLENOL AT THIS TIME. 650MG OF
TYLENOL GIVEN. PATIENT SETTING UP AT BEDSIDE. SIDERAILS UP X2 CALL LIGHT
WITHIN REACH.

## 2020-11-11 NOTE — NUR
PATIENT SITTING AT BEDSIDE, DENIES ANY NEEDS AT THIS TIME 02 ON 3 LITERS,
WHITE PATENT AND DRAINING YELLOW URINE. CALL LIGHT WITHIN REACH SIDERAILS UP X
2.

## 2020-11-11 NOTE — NUR
PATIENT APPEARS SLEEPING POSITIONED ON LEFT SIDE WITH O2 VIA NASAL CANNULA IN
PLACE. HOB IS SLIGHTLY ELEVATED. RESP ARE SHALLOW BUT REGULAR. TELE MONITOR IN
PLACE. WHITE PATENT AND DRAINING YELLOW URINE. CALL LIGHT IN REACH. WILL CONT
TO MONITOR.

## 2020-11-12 VITALS — SYSTOLIC BLOOD PRESSURE: 139 MMHG | DIASTOLIC BLOOD PRESSURE: 84 MMHG

## 2020-11-12 VITALS — SYSTOLIC BLOOD PRESSURE: 140 MMHG | DIASTOLIC BLOOD PRESSURE: 72 MMHG

## 2020-11-12 VITALS — SYSTOLIC BLOOD PRESSURE: 131 MMHG | DIASTOLIC BLOOD PRESSURE: 78 MMHG

## 2020-11-12 VITALS — DIASTOLIC BLOOD PRESSURE: 63 MMHG | SYSTOLIC BLOOD PRESSURE: 145 MMHG

## 2020-11-12 VITALS — DIASTOLIC BLOOD PRESSURE: 77 MMHG | SYSTOLIC BLOOD PRESSURE: 138 MMHG

## 2020-11-12 LAB
ALBUMIN SERPL-MCNC: 3.7 G/DL (ref 3.2–5)
ALP SERPL-CCNC: 61 U/L (ref 38–126)
ANION GAP SERPL CALCULATED.3IONS-SCNC: 9 MMOL/L
AST SERPL-CCNC: 32 U/L (ref 9–36)
BUN SERPL-MCNC: 58 MG/DL (ref 8–23)
BUN/CREAT SERPL: 56
CHLORIDE SERPL-SCNC: 95 MMOL/L (ref 95–108)
CO2 SERPL-SCNC: 42 MMOL/L (ref 22–30)
CREAT SERPL-MCNC: 1 MG/DL (ref 0.5–1)
ERYTHROCYTE [DISTWIDTH] IN BLOOD BY AUTOMATED COUNT: 16.9 % (ref 11.5–15.5)
HCT VFR BLD AUTO: 47 % (ref 37–47)
HGB BLD-MCNC: 14.3 G/DL (ref 12–16)
MCH RBC QN AUTO: 30.7 PG  CALC (ref 26–32)
MCHC RBC AUTO-ENTMCNC: 30.4 G/DL CAL (ref 32–36)
MCV RBC AUTO: 100.9 FL  CALC (ref 80–100)
PLATELET # BLD AUTO: 207 THOU/UL (ref 130–400)
POTASSIUM SERPL-SCNC: 5 MMOL/L (ref 3.5–5.1)
PROT SERPL-MCNC: 6.4 G/DL (ref 6.3–8.2)
RBC # BLD AUTO: 4.66 MILL/UL (ref 4.2–5.6)
SODIUM SERPL-SCNC: 141 MMOL/L (ref 137–146)

## 2020-11-12 NOTE — NUR
11/11/2020
PT note
Patient is in good spirits. She is able to move with improved pace. She is
able to perform supine to sit and perform bed mobility by pushing herself up
in bed with min- mod assist of 1. She is able to perfrom sit to stand with
repition x 5 from an elevated position with min assist. She increased her
ambulation distance to 30 feet limited by dypsnea and using a rollator.
Her vitals remained stable and she perfromed treatment with her O2 in place.
She has O2 at home. Her am Pac is 13 and there are no changes to ourdischarge
recommendations as she would beneefit from follow up physical therapy

## 2020-11-12 NOTE — NUR
PT SITTING AT SIDE OF BED W/ NEB TX AS PER HER REQUEST. SEE E-MAR. PT DENIES
ANY FURTHER NEEDS AT THIS TIME. CALL BELL WITHIN REACH, AGREES TO CALL PRN.

## 2020-11-12 NOTE — NUR
PT SITTING UP AT SIDE OF BED. DENIES RELIEF OF PAIN W/ APAP, REQUESTS ULTRAM.
ULTRAM ADMINISTERED, SEE E-MAR. ASSISTED TO LAY BACK DOWN IN BED. CALL BELL
WITHIN REACH, AGREES TO CALL PRN.

## 2020-11-12 NOTE — NUR
Discharge instructions given. Patient verbalizes understanding of same.
Dis[facility] stable [facility] with
[facility]. All belongings sent with pt.

## 2020-11-12 NOTE — NUR
PT APPEARS TO BE SLEEPING COMFORTABLY, LAYING IN BED, EYES CLOSED,
RESPIRATIONS REGULAR AND UNLABORED. NO APPARENT DISTRESS. CALL CRAWFORD REMAINS
WITHIN REACH.

## 2020-11-12 NOTE — NUR
PATIENT LAYING IN BED TALKING ON TELEPHONE AT THIS TIME. PATIENT STATED NO
NEEDS AT THIS TIME. SAFETY MEASURES ARE IN PLACE CALL LIGHT NEAR.

## 2020-11-12 NOTE — NUR
PATIENT D/C TELE REMOVED AND IV PULLED AT THIS TIME. PATIENT SETTING UP ON
BEDSIDE. HOME O2 TANK FOUND TO BE EMPTY AT THIS TIME. DAUGHTER WENT HOME TO
OBTAIN ANOTHER TANK AND WILL RETURN.

## 2020-11-12 NOTE — NUR
PATEINT SITTING UP AT BEDSIDE DENIES ANY NEEDS AT THIS TIME WHITE PATENT AND
DRAINING CLEAR YELLOW URINE. ALL SAFETY MEASURES ARE IN PLACE.

## 2020-11-12 NOTE — NUR
PT SITTING UP IN BED. MEDICATED WITH PRN APAP PER HER REQUEST. SEE E-MAR. PT
ASSISTED TO LAY DOWN BACK IN BED. CALL BELL WITHIN REACH, AGREES TO CALL PRN.